# Patient Record
Sex: FEMALE | Race: WHITE | NOT HISPANIC OR LATINO | Employment: FULL TIME | URBAN - METROPOLITAN AREA
[De-identification: names, ages, dates, MRNs, and addresses within clinical notes are randomized per-mention and may not be internally consistent; named-entity substitution may affect disease eponyms.]

---

## 2017-07-24 ENCOUNTER — TELEPHONE (OUTPATIENT)
Dept: OBSTETRICS AND GYNECOLOGY | Facility: CLINIC | Age: 47
End: 2017-07-24

## 2017-07-24 DIAGNOSIS — Z12.31 ENCOUNTER FOR SCREENING MAMMOGRAM FOR BREAST CANCER: Primary | ICD-10-CM

## 2017-07-24 NOTE — TELEPHONE ENCOUNTER
----- Message from Michael Lemos sent at 7/24/2017 12:38 PM CDT -----  Contact: Pt  x_ 1st Request  _ 2nd Request  _ 3rd Request    Who: Pt    Why: requesting mmg order    What Number to Call Back: [phone]    When to Expect a call back: (Before the end of the day)  -- if call after 3:00 call back will be tomorrow.

## 2017-08-07 ENCOUNTER — HOSPITAL ENCOUNTER (OUTPATIENT)
Dept: RADIOLOGY | Facility: HOSPITAL | Age: 47
Discharge: HOME | End: 2017-08-07
Attending: OBSTETRICS & GYNECOLOGY
Payer: COMMERCIAL

## 2017-08-07 VITALS — WEIGHT: 148.13 LBS | BODY MASS INDEX: 27.26 KG/M2 | HEIGHT: 62 IN

## 2017-08-07 DIAGNOSIS — Z12.31 ENCOUNTER FOR SCREENING MAMMOGRAM FOR BREAST CANCER: ICD-10-CM

## 2017-08-07 DIAGNOSIS — Z12.31 VISIT FOR SCREENING MAMMOGRAM: ICD-10-CM

## 2017-08-07 PROCEDURE — 77063 BREAST TOMOSYNTHESIS BI: CPT | Mod: 26,,, | Performed by: RADIOLOGY

## 2017-08-07 PROCEDURE — 77067 SCR MAMMO BI INCL CAD: CPT | Mod: 26,,, | Performed by: RADIOLOGY

## 2017-08-07 PROCEDURE — 77067 SCR MAMMO BI INCL CAD: CPT | Mod: TC

## 2017-08-10 ENCOUNTER — OFFICE VISIT (OUTPATIENT)
Dept: OBSTETRICS AND GYNECOLOGY | Facility: CLINIC | Age: 47
End: 2017-08-10
Attending: OBSTETRICS & GYNECOLOGY
Payer: COMMERCIAL

## 2017-08-10 VITALS
HEIGHT: 62 IN | SYSTOLIC BLOOD PRESSURE: 110 MMHG | WEIGHT: 160.94 LBS | BODY MASS INDEX: 29.62 KG/M2 | DIASTOLIC BLOOD PRESSURE: 60 MMHG

## 2017-08-10 DIAGNOSIS — Z12.4 PAP SMEAR FOR CERVICAL CANCER SCREENING: ICD-10-CM

## 2017-08-10 DIAGNOSIS — N95.1 PERIMENOPAUSAL: ICD-10-CM

## 2017-08-10 DIAGNOSIS — Z01.419 WELL WOMAN EXAM WITH ROUTINE GYNECOLOGICAL EXAM: Primary | ICD-10-CM

## 2017-08-10 PROCEDURE — 99999 PR PBB SHADOW E&M-EST. PATIENT-LVL III: CPT | Mod: PBBFAC,,, | Performed by: OBSTETRICS & GYNECOLOGY

## 2017-08-10 PROCEDURE — 88142 CYTOPATH C/V THIN LAYER: CPT

## 2017-08-10 PROCEDURE — 99396 PREV VISIT EST AGE 40-64: CPT | Mod: S$GLB,,, | Performed by: OBSTETRICS & GYNECOLOGY

## 2017-08-10 RX ORDER — AZITHROMYCIN 250 MG/1
TABLET, FILM COATED ORAL
COMMUNITY
Start: 2017-07-28 | End: 2017-08-10

## 2017-08-10 NOTE — PROGRESS NOTES
Jayne Mejia is a 47 y.o. female  who presents for annual exam.  Patient's last menstrual period was 2016.  She is perimenopausal with LMP 8 months ago.  Denies hot flashes / sweats.  No vaginal dryness.  Reports some tenderness in her breasts- recent mammogram was negative.  Denies changes in her medical / surgical history over the past year.  No GYN complaints.    Mammogram 17: Negative    Past Medical History:   Diagnosis Date    Bartholin cyst     History of abdominoplasty        Past Surgical History:   Procedure Laterality Date    Essure tubal occlusion      NOSE SURGERY      TUBAL LIGATION         OB History      Para Term  AB Living    1 1 1          SAB TAB Ectopic Multiple Live Births                       ROS:  GENERAL: Feeling well overall.   SKIN: Denies rash or lesions.   HEAD: Denies head injury or headache.   NODES: Denies enlarged lymph nodes.   CHEST: Denies chest pain or shortness of breath.   CARDIOVASCULAR: Denies palpitations or left sided chest pain.   ABDOMEN: No abdominal pain, nausea, vomiting or rectal bleeding.   URINARY: No dysuria or hematuria.  REPRODUCTIVE: See HPI.   BREASTS: Reports breast tenderness.  Denies lumps, or nipple discharge.   HEMATOLOGIC: No easy bruisability or excessive bleeding.   MUSCULOSKELETAL: Denies joint pain or swelling.   NEUROLOGIC: Denies syncope or weakness.   PSYCHIATRIC: Denies depression.    PE:   (chaperone present during entire exam)  APPEARANCE: Well nourished, well developed, in no acute distress.  BREASTS: Symmetrical, no skin changes or visible lesions. No palpable masses, nipple discharge or adenopathy bilaterally.  ABDOMEN: Soft. No tenderness or masses. No hernias. No CVA tenderness.  VULVA: No lesions. Normal female genitalia.  URETHRAL MEATUS: Normal size and location, no lesions, no prolapse.  URETHRA: No masses, tenderness, prolapse or scarring.  VAGINA: No lesions, no discharge, no significant cystocele  or rectocele.  CERVIX: No lesions and discharge. PAP done.  UTERUS: Normal size, regular shape, mobile, non-tender, bladder base nontender.  ADNEXA: No masses, tenderness or CDS nodularity.  ANUS PERINEUM: Normal.      Diagnosis:  1. Well woman exam with routine gynecological exam    2. Perimenopausal    3. Pap smear for cervical cancer screening          PLAN:    Orders Placed This Encounter    Liquid-based pap smear, screening       Patient was counseled today on perimenopausal issues.      Follow-up in 1 year.

## 2018-03-13 ENCOUNTER — TELEPHONE (OUTPATIENT)
Dept: OBSTETRICS AND GYNECOLOGY | Facility: CLINIC | Age: 48
End: 2018-03-13

## 2018-03-13 NOTE — TELEPHONE ENCOUNTER
----- Message from Ori Ross sent at 3/13/2018 10:30 AM CDT -----  PLEASE CALL PT SHE IS HAVING SOME ABN BLEEDING [id number]

## 2018-03-13 NOTE — TELEPHONE ENCOUNTER
Tried ext provided and voicemail, unsure if secure. LMOVM to have the patient call the office. On the cell phone number in the chart

## 2018-05-21 DIAGNOSIS — R07.9 CHEST PAIN, UNSPECIFIED TYPE: Primary | ICD-10-CM

## 2018-05-22 ENCOUNTER — OFFICE VISIT (OUTPATIENT)
Dept: CARDIOLOGY | Facility: CLINIC | Age: 48
End: 2018-05-22
Payer: COMMERCIAL

## 2018-05-22 ENCOUNTER — HOSPITAL ENCOUNTER (OUTPATIENT)
Dept: CARDIOLOGY | Facility: CLINIC | Age: 48
Discharge: HOME | End: 2018-05-22
Payer: COMMERCIAL

## 2018-05-22 ENCOUNTER — HOSPITAL ENCOUNTER (OUTPATIENT)
Dept: RADIOLOGY | Facility: HOSPITAL | Age: 48
Discharge: HOME | End: 2018-05-22
Attending: INTERNAL MEDICINE
Payer: COMMERCIAL

## 2018-05-22 VITALS
HEART RATE: 70 BPM | DIASTOLIC BLOOD PRESSURE: 80 MMHG | HEIGHT: 62 IN | SYSTOLIC BLOOD PRESSURE: 120 MMHG | WEIGHT: 160 LBS | BODY MASS INDEX: 29.44 KG/M2

## 2018-05-22 DIAGNOSIS — R51.9 CHRONIC NONINTRACTABLE HEADACHE, UNSPECIFIED HEADACHE TYPE: ICD-10-CM

## 2018-05-22 DIAGNOSIS — G89.29 CHRONIC NONINTRACTABLE HEADACHE, UNSPECIFIED HEADACHE TYPE: ICD-10-CM

## 2018-05-22 DIAGNOSIS — R07.9 CHEST PAIN, UNSPECIFIED TYPE: ICD-10-CM

## 2018-05-22 DIAGNOSIS — R07.89 OTHER CHEST PAIN: ICD-10-CM

## 2018-05-22 DIAGNOSIS — R55 SYNCOPE AND COLLAPSE: ICD-10-CM

## 2018-05-22 DIAGNOSIS — R07.89 OTHER CHEST PAIN: Primary | ICD-10-CM

## 2018-05-22 PROCEDURE — 93000 ELECTROCARDIOGRAM COMPLETE: CPT | Mod: S$GLB,,, | Performed by: INTERNAL MEDICINE

## 2018-05-22 PROCEDURE — 70450 CT HEAD/BRAIN W/O DYE: CPT | Mod: TC

## 2018-05-22 PROCEDURE — 70450 CT HEAD/BRAIN W/O DYE: CPT | Mod: 26,,, | Performed by: RADIOLOGY

## 2018-05-22 PROCEDURE — 99204 OFFICE O/P NEW MOD 45 MIN: CPT | Mod: S$GLB,,, | Performed by: INTERNAL MEDICINE

## 2018-05-22 PROCEDURE — 3008F BODY MASS INDEX DOCD: CPT | Mod: CPTII,S$GLB,, | Performed by: INTERNAL MEDICINE

## 2018-05-22 PROCEDURE — 99999 PR PBB SHADOW E&M-EST. PATIENT-LVL III: CPT | Mod: PBBFAC,,, | Performed by: INTERNAL MEDICINE

## 2018-05-22 NOTE — PROGRESS NOTES
Subjective:   Patient ID:  Jayne Mejia is a 48 y.o. female who presents for evaluation of Dizziness      HPI: She had a syncopal about one month ago after taking bactrim and benadryl. She awoke covered in a rash and got up to go to the bathroom. Without prodrome she syncopized and hit the front of her neck/ jaw and left shoulder on the ground. Her teeth went through her lip. She did not seek medical treatment at the time. She reports that for the past two weeks, she has not felt right. She has a constant fullness in her head and feels like her tongue is swollen. She has no other focal neurologic deficits. She has no pain in her head or neck. She also reports that for the past one week, she has developed intermittent pressure on the left side of her chest which radiates to her left shoulder, left side of her neck and left jaw. It is brought on by emotional stress and resolves on it own. It is not exertional. She has not exercised regularly since December. She has no cardiac history.    ECG: NSR 77 bpm.    Past Medical History:   Diagnosis Date    Bartholin cyst     History of abdominoplasty        Past Surgical History:   Procedure Laterality Date    Essure tubal occlusion      NOSE SURGERY      TUBAL LIGATION         Social History     Social History    Marital status:      Spouse name: N/A    Number of children: N/A    Years of education: N/A     Social History Main Topics    Smoking status: Never Smoker    Smokeless tobacco: Never Used    Alcohol use No    Drug use: No    Sexual activity: Yes     Partners: Male     Other Topics Concern    None     Social History Narrative    None       Family History   Problem Relation Age of Onset    Breast cancer Paternal Grandmother     Cancer Paternal Grandmother     Colon cancer Paternal Grandmother     Heart attack Maternal Grandfather     Hypertension Maternal Grandmother     Diabetes Maternal Grandmother     Hashimoto's thyroiditis Sister      "Ovarian cancer Neg Hx        Patient's Medications   New Prescriptions    No medications on file   Previous Medications    FISH OIL-OMEGA-3 FATTY ACIDS 300-1,000 MG CAPSULE    Take 2 g by mouth once daily.    MULTIVITAMIN (ONE DAILY MULTIVITAMIN) PER TABLET    Take 1 tablet by mouth once daily.    VITAMIN D 1000 UNITS TAB    Take 185 mg by mouth once daily.   Modified Medications    No medications on file   Discontinued Medications    No medications on file       Review of Systems   Constitution: Negative for weakness, malaise/fatigue and weight gain.   HENT: Negative for hearing loss.    Eyes: Negative for visual disturbance.   Cardiovascular: Positive for chest pain and syncope. Negative for claudication, dyspnea on exertion, leg swelling, near-syncope, orthopnea, palpitations and paroxysmal nocturnal dyspnea.   Respiratory: Negative for cough, shortness of breath, sleep disturbances due to breathing, snoring and wheezing.    Endocrine: Negative for cold intolerance, heat intolerance, polydipsia, polyphagia and polyuria.   Hematologic/Lymphatic: Negative for bleeding problem. Does not bruise/bleed easily.   Skin: Negative for rash and suspicious lesions.   Musculoskeletal: Negative for arthritis, falls, joint pain, muscle weakness and myalgias.   Gastrointestinal: Negative for abdominal pain, change in bowel habit, constipation, diarrhea, heartburn, hematochezia, melena and nausea.   Genitourinary: Negative for hematuria and nocturia.   Neurological: Positive for headaches and paresthesias. Negative for excessive daytime sleepiness, dizziness, light-headedness and loss of balance.   Psychiatric/Behavioral: Negative for depression. The patient is not nervous/anxious.    Allergic/Immunologic: Negative for environmental allergies.       /80   Pulse 70   Ht 5' 2" (1.575 m)   Wt 72.6 kg (160 lb)   BMI 29.26 kg/m²     Objective:   Physical Exam   Constitutional: She is oriented to person, place, and time. She " appears well-developed and well-nourished.        HENT:   Head: Normocephalic and atraumatic.   Mouth/Throat: Oropharynx is clear and moist.   Eyes: Conjunctivae and EOM are normal. Pupils are equal, round, and reactive to light. No scleral icterus.   Neck: Normal range of motion. Neck supple. No hepatojugular reflux and no JVD present. No tracheal deviation present. No thyromegaly present.   Cardiovascular: Normal rate, regular rhythm, normal heart sounds and intact distal pulses.  PMI is not displaced.    Pulses:       Carotid pulses are 2+ on the right side, and 2+ on the left side.       Radial pulses are 2+ on the right side, and 2+ on the left side.        Dorsalis pedis pulses are 2+ on the right side, and 2+ on the left side.        Posterior tibial pulses are 2+ on the right side, and 2+ on the left side.   Pulmonary/Chest: Effort normal and breath sounds normal.   Abdominal: Soft. Bowel sounds are normal. She exhibits no distension and no mass. There is no hepatosplenomegaly. There is no tenderness.   Musculoskeletal: She exhibits no edema or tenderness.   Lymphadenopathy:     She has no cervical adenopathy.   Neurological: She is alert and oriented to person, place, and time.   Skin: Skin is warm and dry. No rash noted. No cyanosis or erythema. Nails show no clubbing.   Psychiatric: She has a normal mood and affect. Her speech is normal and behavior is normal.       Lab Results   Component Value Date     11/04/2015    K 4.0 11/04/2015     11/04/2015    CO2 26 11/04/2015    BUN 11 11/04/2015    CREATININE 0.7 11/04/2015    GLU 82 11/04/2015    AST 19 11/04/2015    ALT 13 11/04/2015    ALBUMIN 3.9 11/04/2015    PROT 7.1 11/04/2015    BILITOT 0.3 11/04/2015    WBC 7.25 11/04/2015    HGB 13.4 11/04/2015    HCT 39.4 11/04/2015    MCV 88 11/04/2015     11/04/2015    INR 0.9 05/13/2008    TSH 1.389 11/04/2015    CHOL 189 11/04/2015    HDL 53 11/04/2015    LDLCALC 119.6 11/04/2015    TRIG 82  11/04/2015       Assessment:     1. Other chest pain : I suspect her discomfort is likely musculoskeletal due to her recent syncopal event, however, her pain is not reproducible on exam or with movement. I have ordered an exercise stress echo for further evaluation.    2. Syncope and collapse : Likely secondary to polypharmacy at the time. No recurrence.   3. Chronic nonintractable headache, unspecified headache type : Given her recent syncopal event and head trauma, I have ordered a CT head to rule out subdural hematoma. If her work up is negative, we discussed following up with her PCP for evaluation of a possible neck injury.       Plan:     Jayne was seen today for dizziness.    Diagnoses and all orders for this visit:    Other chest pain  -     CT Head Without Contrast; Future  -     Exercise stress echo with color flow; Future  -     EKG 12-LEAD - Muse; Future    Syncope and collapse  -     CT Head Without Contrast; Future  -     Exercise stress echo with color flow; Future  -     EKG 12-LEAD - Muse; Future    Chronic nonintractable headache, unspecified headache type  -     CT Head Without Contrast; Future  -     Exercise stress echo with color flow; Future  -     EKG 12-LEAD - Muse; Future        Thank you for allowing me to participate in this patient's care. Please do not hesitate to contact me with any questions or concerns.

## 2018-05-22 NOTE — LETTER
May 22, 2018      Amanda Willoughby MD  [address]  [facility] [address]           [facility] - Cardiology  [address]  [facility] [address]  Phone: [phone]          Patient: Jayne Mejia   MR Number: [id number]   YOB: 1970   Date of Visit: 5/22/2018       Dear Dr. Amanda Willoughby:    Thank you for referring Jayne Mejia to me for evaluation. Attached you will find relevant portions of my assessment and plan of care.    If you have questions, please do not hesitate to call me. I look forward to following Jayne Mejia along with you.    Sincerely,    Alayna Aranda MD    Enclosure  CC:  No Recipients    If you would like to receive this communication electronically, please contact [email or web address] or [phone] to request more information on [employer] access.    For providers and/or their staff who would like to refer a patient to [facility], please contact us through our one-stop-shop provider referral line, [facility] , at [phone].    If you feel you have received this communication in error or would no longer like to receive these types of communications, please e-mail [email or web address]

## 2018-05-23 ENCOUNTER — TELEPHONE (OUTPATIENT)
Dept: CARDIOLOGY | Facility: CLINIC | Age: 48
End: 2018-05-23

## 2018-05-26 ENCOUNTER — LAB VISIT (OUTPATIENT)
Dept: LAB | Facility: HOSPITAL | Age: 48
End: 2018-05-26
Attending: INTERNAL MEDICINE
Payer: COMMERCIAL

## 2018-05-26 DIAGNOSIS — E28.39 RESISTANT OVARY SYNDROME: Primary | ICD-10-CM

## 2018-05-26 DIAGNOSIS — R51.9 FACIAL PAIN: ICD-10-CM

## 2018-05-26 DIAGNOSIS — R53.81 DEBILITY: ICD-10-CM

## 2018-05-26 DIAGNOSIS — E53.8 BIOTIN-(PROPIONYL-COA-CARBOXYLASE) LIGASE DEFICIENCY: ICD-10-CM

## 2018-05-26 LAB
25(OH)D3+25(OH)D2 SERPL-MCNC: 62 NG/ML
ALBUMIN SERPL BCP-MCNC: 4.2 G/DL
ALP SERPL-CCNC: 61 U/L
ALT SERPL W/O P-5'-P-CCNC: 21 U/L
ANION GAP SERPL CALC-SCNC: 9 MMOL/L
AST SERPL-CCNC: 21 U/L
BASOPHILS # BLD AUTO: 0.03 K/UL
BASOPHILS NFR BLD: 0.6 %
BILIRUB SERPL-MCNC: 0.6 MG/DL
BILIRUB UR QL STRIP: NEGATIVE
BUN SERPL-MCNC: 14 MG/DL
CALCIUM SERPL-MCNC: 9.3 MG/DL
CHLORIDE SERPL-SCNC: 104 MMOL/L
CHOLEST SERPL-MCNC: 175 MG/DL
CHOLEST/HDLC SERPL: 3.3 {RATIO}
CK SERPL-CCNC: 63 U/L
CLARITY UR: CLEAR
CO2 SERPL-SCNC: 25 MMOL/L
COLOR UR: YELLOW
CREAT SERPL-MCNC: 0.7 MG/DL
DIFFERENTIAL METHOD: NORMAL
EOSINOPHIL # BLD AUTO: 0.3 K/UL
EOSINOPHIL NFR BLD: 5.5 %
ERYTHROCYTE [DISTWIDTH] IN BLOOD BY AUTOMATED COUNT: 12.7 %
EST. GFR  (AFRICAN AMERICAN): >60 ML/MIN/1.73 M^2
EST. GFR  (NON AFRICAN AMERICAN): >60 ML/MIN/1.73 M^2
ESTIMATED AVG GLUCOSE: 94 MG/DL
ESTRADIOL SERPL-MCNC: <10 PG/ML
FERRITIN SERPL-MCNC: 61 NG/ML
FOLATE SERPL-MCNC: 13.8 NG/ML
GLUCOSE SERPL-MCNC: 89 MG/DL
GLUCOSE UR QL STRIP: NEGATIVE
HBA1C MFR BLD HPLC: 4.9 %
HCT VFR BLD AUTO: 39 %
HDLC SERPL-MCNC: 53 MG/DL
HDLC SERPL: 30.3 %
HGB BLD-MCNC: 12.8 G/DL
HGB UR QL STRIP: NEGATIVE
IMM GRANULOCYTES # BLD AUTO: 0.01 K/UL
IMM GRANULOCYTES NFR BLD AUTO: 0.2 %
IRON SERPL-MCNC: 75 UG/DL
KETONES UR QL STRIP: NEGATIVE
LDLC SERPL CALC-MCNC: 108.8 MG/DL
LEUKOCYTE ESTERASE UR QL STRIP: NEGATIVE
LYMPHOCYTES # BLD AUTO: 1.8 K/UL
LYMPHOCYTES NFR BLD: 35.6 %
MCH RBC QN AUTO: 29.6 PG
MCHC RBC AUTO-ENTMCNC: 32.8 G/DL
MCV RBC AUTO: 90 FL
MONOCYTES # BLD AUTO: 0.4 K/UL
MONOCYTES NFR BLD: 8.5 %
NEUTROPHILS # BLD AUTO: 2.5 K/UL
NEUTROPHILS NFR BLD: 49.6 %
NITRITE UR QL STRIP: NEGATIVE
NONHDLC SERPL-MCNC: 122 MG/DL
NRBC BLD-RTO: 0 /100 WBC
PH UR STRIP: 6 [PH] (ref 5–8)
PLATELET # BLD AUTO: 277 K/UL
PMV BLD AUTO: 9.2 FL
POTASSIUM SERPL-SCNC: 3.9 MMOL/L
PROT SERPL-MCNC: 7.5 G/DL
PROT UR QL STRIP: NEGATIVE
RBC # BLD AUTO: 4.32 M/UL
SATURATED IRON: 20 %
SODIUM SERPL-SCNC: 138 MMOL/L
SP GR UR STRIP: 1.01 (ref 1–1.03)
T3FREE SERPL-MCNC: 3 PG/ML
T4 FREE SERPL-MCNC: 1 NG/DL
TOTAL IRON BINDING CAPACITY: 383 UG/DL
TRANSFERRIN SERPL-MCNC: 259 MG/DL
TRIGL SERPL-MCNC: 66 MG/DL
TSH SERPL DL<=0.005 MIU/L-ACNC: 1.88 UIU/ML
URN SPEC COLLECT METH UR: NORMAL
VIT B12 SERPL-MCNC: 726 PG/ML
WBC # BLD AUTO: 4.94 K/UL

## 2018-05-26 PROCEDURE — 84481 FREE ASSAY (FT-3): CPT

## 2018-05-26 PROCEDURE — 85025 COMPLETE CBC W/AUTO DIFF WBC: CPT

## 2018-05-26 PROCEDURE — 36415 COLL VENOUS BLD VENIPUNCTURE: CPT | Mod: PO

## 2018-05-26 PROCEDURE — 82306 VITAMIN D 25 HYDROXY: CPT

## 2018-05-26 PROCEDURE — 82746 ASSAY OF FOLIC ACID SERUM: CPT

## 2018-05-26 PROCEDURE — 82728 ASSAY OF FERRITIN: CPT

## 2018-05-26 PROCEDURE — 82670 ASSAY OF TOTAL ESTRADIOL: CPT

## 2018-05-26 PROCEDURE — 80061 LIPID PANEL: CPT

## 2018-05-26 PROCEDURE — 84439 ASSAY OF FREE THYROXINE: CPT

## 2018-05-26 PROCEDURE — 83036 HEMOGLOBIN GLYCOSYLATED A1C: CPT

## 2018-05-26 PROCEDURE — 83540 ASSAY OF IRON: CPT

## 2018-05-26 PROCEDURE — 81003 URINALYSIS AUTO W/O SCOPE: CPT | Mod: PO

## 2018-05-26 PROCEDURE — 82550 ASSAY OF CK (CPK): CPT

## 2018-05-26 PROCEDURE — 82607 VITAMIN B-12: CPT

## 2018-05-26 PROCEDURE — 80053 COMPREHEN METABOLIC PANEL: CPT

## 2018-05-26 PROCEDURE — 86376 MICROSOMAL ANTIBODY EACH: CPT

## 2018-05-26 PROCEDURE — 84443 ASSAY THYROID STIM HORMONE: CPT

## 2018-05-28 ENCOUNTER — OFFICE VISIT (OUTPATIENT)
Dept: INTERNAL MEDICINE | Facility: CLINIC | Age: 48
End: 2018-05-28
Payer: COMMERCIAL

## 2018-05-28 VITALS
HEART RATE: 98 BPM | BODY MASS INDEX: 29.9 KG/M2 | OXYGEN SATURATION: 98 % | DIASTOLIC BLOOD PRESSURE: 60 MMHG | WEIGHT: 162.5 LBS | SYSTOLIC BLOOD PRESSURE: 100 MMHG | HEIGHT: 62 IN

## 2018-05-28 DIAGNOSIS — S09.90XS HEAD TRAUMA, SEQUELA: ICD-10-CM
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
## 2018-05-28 LAB — THYROPEROXIDASE IGG SERPL-ACNC: <6 IU/ML

## 2018-05-28 PROCEDURE — 99999 PR PBB SHADOW E&M-EST. PATIENT-LVL III: CPT | Mod: PBBFAC,,, | Performed by: INTERNAL MEDICINE

## 2018-05-28 PROCEDURE — 99214 OFFICE O/P EST MOD 30 MIN: CPT | Mod: S$GLB,,, | Performed by: INTERNAL MEDICINE

## 2018-05-28 PROCEDURE — 3008F BODY MASS INDEX DOCD: CPT | Mod: CPTII,S$GLB,, | Performed by: INTERNAL MEDICINE

## 2018-05-28 NOTE — PROGRESS NOTES
"Subjective:      Patient ID: Jayne Mejia is a 48 y.o. female.    Chief Complaint: Headache and Dizziness    HPI:  HPI   5 weeks ago patient had a fall after a vasovagal event related to an illness and medication. Her  is a physician and ordered labs all of which are normal. Patient notes that she hit her jaw and feels that this also caused her temporarily to lose consciousness. She did not seek treatment at this time but has subsequently seen Cardiology where a CT of the head was ordered.    2 weeks after had new symptoms of light headed and dizzy which have continued   Patient Active Problem List   Diagnosis    Other chest pain    Syncope and collapse    Chronic nonintractable headache     Past Medical History:   Diagnosis Date    Bartholin cyst     History of abdominoplasty      Past Surgical History:   Procedure Laterality Date    Essure tubal occlusion      NOSE SURGERY      TUBAL LIGATION       Family History   Problem Relation Age of Onset    Breast cancer Paternal Grandmother     Cancer Paternal Grandmother     Colon cancer Paternal Grandmother     Heart attack Maternal Grandfather     Hypertension Maternal Grandmother     Diabetes Maternal Grandmother     Hashimoto's thyroiditis Sister     Ovarian cancer Neg Hx      Review of Systems: as above  Objective:     Vitals:    05/28/18 1411   BP: 100/60   Pulse: 98   SpO2: 98%   Weight: 73.7 kg (162 lb 7.7 oz)   Height: 5' 2" (1.575 m)   PainSc: 0-No pain     Body mass index is 29.72 kg/m².  Physical Exam   Constitutional: She is oriented to person, place, and time. She appears well-developed and well-nourished. No distress.   Neck: Carotid bruit is not present. No thyromegaly present.   Cardiovascular: Normal rate, regular rhythm and normal heart sounds.  PMI is not displaced.    Pulmonary/Chest: Effort normal and breath sounds normal. No respiratory distress.   Abdominal: Soft. Bowel sounds are normal. She exhibits no distension. There is " no tenderness.   Musculoskeletal: She exhibits no edema.   Neurological: She is alert and oriented to person, place, and time. She has normal strength. No cranial nerve deficit. She displays a negative Romberg sign. Coordination and gait normal.     Assessment:     1. Fall, sequela    2. Head trauma, sequela      Plan:   Jayne was seen today for headache and dizziness.    Diagnoses and all orders for this visit:    Fall, sequela: no direct physical complications found  -     Ambulatory consult to Neurology    Head trauma, sequela: will have the patient see Neurology for a possible concussison syndrome.  -     Ambulatory consult to Neurology      Follow-up if symptoms worsen or fail to improve.     Medication List          Accurate as of 5/28/18  2:46 PM. If you have any questions, ask your nurse or doctor.               CONTINUE taking these medications    fish oil-omega-3 fatty acids 300-1,000 mg capsule     ONE DAILY MULTIVITAMIN per tablet  Generic drug:  multivitamin     vitamin D 1000 units Tab

## 2018-05-29 ENCOUNTER — HOSPITAL ENCOUNTER (OUTPATIENT)
Dept: CARDIOLOGY | Facility: CLINIC | Age: 48
Discharge: HOME | End: 2018-05-29
Attending: INTERNAL MEDICINE
Payer: COMMERCIAL

## 2018-05-29 DIAGNOSIS — R51.9 CHRONIC NONINTRACTABLE HEADACHE, UNSPECIFIED HEADACHE TYPE: ICD-10-CM

## 2018-05-29 DIAGNOSIS — R55 SYNCOPE AND COLLAPSE: ICD-10-CM

## 2018-05-29 DIAGNOSIS — R07.89 OTHER CHEST PAIN: ICD-10-CM

## 2018-05-29 DIAGNOSIS — G89.29 CHRONIC NONINTRACTABLE HEADACHE, UNSPECIFIED HEADACHE TYPE: ICD-10-CM

## 2018-05-29 LAB
DIASTOLIC DYSFUNCTION: NO
ESTIMATED PA SYSTOLIC PRESSURE: 22.18
MITRAL VALVE MOBILITY: NORMAL
RETIRED EF AND QEF - SEE NOTES: 55 (ref 55–65)

## 2018-05-29 PROCEDURE — 93320 DOPPLER ECHO COMPLETE: CPT | Mod: S$GLB,,, | Performed by: INTERNAL MEDICINE

## 2018-05-29 PROCEDURE — 93351 STRESS TTE COMPLETE: CPT | Mod: S$GLB,,, | Performed by: INTERNAL MEDICINE

## 2018-05-29 PROCEDURE — 93325 DOPPLER ECHO COLOR FLOW MAPG: CPT | Mod: S$GLB,,, | Performed by: INTERNAL MEDICINE

## 2018-05-30 ENCOUNTER — TELEPHONE (OUTPATIENT)
Dept: CARDIOLOGY | Facility: CLINIC | Age: 48
End: 2018-05-30

## 2018-05-30 DIAGNOSIS — Z12.11 COLON CANCER SCREENING: ICD-10-CM

## 2018-06-20 ENCOUNTER — OFFICE VISIT (OUTPATIENT)
Dept: NEUROLOGY | Facility: CLINIC | Age: 48
End: 2018-06-20
Payer: COMMERCIAL

## 2018-06-20 VITALS
HEIGHT: 62 IN | BODY MASS INDEX: 29.44 KG/M2 | HEART RATE: 82 BPM | DIASTOLIC BLOOD PRESSURE: 78 MMHG | WEIGHT: 160 LBS | SYSTOLIC BLOOD PRESSURE: 115 MMHG

## 2018-06-20 DIAGNOSIS — S09.90XS DIZZINESS DUE TO OLD HEAD INJURY: ICD-10-CM

## 2018-06-20 DIAGNOSIS — R42 DIZZINESS DUE TO OLD HEAD INJURY: ICD-10-CM

## 2018-06-20 DIAGNOSIS — F07.81 POSTCONCUSSIVE SYNDROME: Primary | ICD-10-CM

## 2018-06-20 PROCEDURE — 99999 PR PBB SHADOW E&M-EST. PATIENT-LVL III: CPT | Mod: PBBFAC,,, | Performed by: PSYCHIATRY & NEUROLOGY

## 2018-06-20 PROCEDURE — 99204 OFFICE O/P NEW MOD 45 MIN: CPT | Mod: S$GLB,,, | Performed by: PSYCHIATRY & NEUROLOGY

## 2018-06-20 PROCEDURE — 3008F BODY MASS INDEX DOCD: CPT | Mod: CPTII,S$GLB,, | Performed by: PSYCHIATRY & NEUROLOGY

## 2018-06-20 RX ORDER — AMITRIPTYLINE HYDROCHLORIDE 25 MG/1
25 TABLET, FILM COATED ORAL NIGHTLY
Qty: 30 TABLET | Refills: 11 | Status: SHIPPED | OUTPATIENT
Start: 2018-06-20 | End: 2018-12-11 | Stop reason: SDUPTHER

## 2018-06-20 NOTE — ASSESSMENT & PLAN NOTE
"Persistent but mild dull headache, complaint of "fogginess" and some dizziness since falling and striking head about 2 months ago. Nothing focal on examination and central imaging was negative for evidence of bleed. Symptoms are consistent with post-concussive syndrome with some vestibular dysfunction. We discussed that symptoms such as these following concussion are sometimes slow to resolve and may require several months to resolve. She was encouraged to continue to add elements from previous active lifestyle back into her daily regimen as tolerated (exercise, social activity, work, etc).    - Referral to PT with vestibular assessment and training   - Trial of Elavil 25 mg QHS  "

## 2018-06-20 NOTE — LETTER
June 20, 2018      Amanda Willoughby MD  [address]  [facility] [address]           [facility]  [address]  [facility] [address]  Phone: [phone]  Fax: [phone]          Patient: Jayne Mejia   MR Number: [id number]   YOB: 1970   Date of Visit: 6/20/2018       Dear Dr. Amanda Willoughby:    Thank you for referring Jayne Mejia to me for evaluation. Attached you will find relevant portions of my assessment and plan of care.    If you have questions, please do not hesitate to call me. I look forward to following Jayne Mejia along with you.    Sincerely,    Joshua Mckeon MD    Enclosure  CC:  No Recipients    If you would like to receive this communication electronically, please contact [email or web address] or [phone] to request more information on [employer] Link access.    For providers and/or their staff who would like to refer a patient to [facility], please contact us through our one-stop-shop provider referral line, [facility], at [phone].    If you feel you have received this communication in error or would no longer like to receive these types of communications, please e-mail [email or web address]

## 2018-06-20 NOTE — PROGRESS NOTES
"Subjective:     Chief Complaint:  Consult      History of Present Illness:  Jayne Mejia is a 48 y.o. female who presents today for initial consultation for symptoms of mild dizziness, "fogginess" and persistent dull headache that have been present since she fell and struck her head at home in April 2018. She had taken Bactrim and Benadryl, became dizzy and fell when she stood up. She struck her jaw on furniture and believes that she lost consciousness. There were no features worrisome for seizure. She had a significant lesion to the lower lip. CT Head was done at the ER and showed no evidence of intracranial bleed. She has had no focal deficits since the accident and has recently resumed daily exercising, etc. There have been no changes in coordination, speech, vision, sensation, mentation, orientation, etc. No tinnitus. She does complain of persistent mild headache that is responsive the NSAIDs, which she uses only occasionally. Symptoms have not been progressive, but are also not getting better.    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 She is driving and denies any difficulties with perception, directions, etc.    Review of Systems  Review of Systems   Constitutional: Negative for activity change, fatigue and fever.   HENT: Negative for hearing loss, trouble swallowing and voice change.    Eyes: Negative for pain, redness and visual disturbance.   Respiratory: Negative for choking, chest tightness and shortness of breath.    Cardiovascular: Negative for chest pain.   Gastrointestinal: Negative for abdominal pain, nausea and vomiting.   Endocrine: Negative for cold intolerance.   Genitourinary: Negative for frequency.   Musculoskeletal: Negative for arthralgias, back pain, gait problem, joint swelling, myalgias and neck pain.   Skin: Negative for color change.   Allergic/Immunologic: Negative for immunocompromised state.   Neurological: " "Positive for dizziness and headaches. Negative for seizures, speech difficulty, weakness and numbness.   Hematological: Negative for adenopathy.   Psychiatric/Behavioral: Negative for agitation, behavioral problems, dysphoric mood and suicidal ideas.        Objective:     Vitals:    06/20/18 1307   BP: 115/78   Pulse: 82   Weight: 72.6 kg (160 lb)   Height: 5' 2" (1.575 m)   PainSc: 0-No pain       Neurologic Exam     Mental Status   Oriented to person, place, and time.   Attention: normal.   Speech: speech is normal   Level of consciousness: alert  Knowledge: good.     Cranial Nerves     CN II   Visual fields full to confrontation.     CN III, IV, VI   Pupils are equal, round, and reactive to light.  Extraocular motions are normal.     CN V   Facial sensation intact.     CN VII   Facial expression full, symmetric.     CN VIII   Hearing: intact    CN IX, X   CN IX normal.     CN XI   CN XI normal.     CN XII   CN XII normal.     Motor Exam   Muscle bulk: normal  Overall muscle tone: normal    Strength   Strength 5/5 throughout.     Sensory Exam   Light touch normal.   Vibration normal.     Gait, Coordination, and Reflexes     Gait  Gait: normal    Tremor   Resting tremor: absent  Intention tremor: absent  Action tremor: absent    Reflexes   Right brachioradialis: 2+  Left brachioradialis: 2+  Right biceps: 2+  Left biceps: 2+  Right triceps: 2+  Left triceps: 2+  Right patellar: 2+  Left patellar: 2+  Right achilles: 2+  Left achilles: 2+      Physical Exam   Constitutional: She is oriented to person, place, and time. She appears well-developed and well-nourished.   HENT:   Head: Normocephalic and atraumatic.   Eyes: EOM are normal. Pupils are equal, round, and reactive to light.   Neck: Normal range of motion. Neck supple. No thyromegaly present.   Cardiovascular: Normal rate.    Pulmonary/Chest: Effort normal.   Abdominal: Soft.   Lymphadenopathy:     She has no cervical adenopathy.   Neurological: She is oriented " "to person, place, and time. She has normal strength. Gait normal.   Reflex Scores:       Tricep reflexes are 2+ on the right side and 2+ on the left side.       Bicep reflexes are 2+ on the right side and 2+ on the left side.       Brachioradialis reflexes are 2+ on the right side and 2+ on the left side.       Patellar reflexes are 2+ on the right side and 2+ on the left side.       Achilles reflexes are 2+ on the right side and 2+ on the left side.  Skin: Skin is warm and dry.   Psychiatric: She has a normal mood and affect. Her speech is normal and behavior is normal. Thought content normal.   Vitals reviewed.        Lab Results   Component Value Date    WBC 4.94 05/26/2018    HGB 12.8 05/26/2018    HCT 39.0 05/26/2018     05/26/2018    CHOL 175 05/26/2018    TRIG 66 05/26/2018    HDL 53 05/26/2018    ALT 21 05/26/2018    AST 21 05/26/2018     05/26/2018    K 3.9 05/26/2018     05/26/2018    CREATININE 0.7 05/26/2018    BUN 14 05/26/2018    CO2 25 05/26/2018    TSH 1.884 05/26/2018    HGBA1C 4.9 05/26/2018    [id number] 05/26/2018         Assessment and Plan:     Problem List Items Addressed This Visit     Postconcussive syndrome - Primary    Current Assessment & Plan     Persistent but mild dull headache, complaint of "fogginess" and some dizziness since falling and striking head about 2 months ago. Nothing focal on examination and central imaging was negative for evidence of bleed. Symptoms are consistent with post-concussive syndrome with some vestibular dysfunction. We discussed that symptoms such as these following concussion are sometimes slow to resolve and may require several months to resolve. She was encouraged to continue to add elements from previous active lifestyle back into her daily regimen as tolerated (exercise, social activity, work, etc).    - Referral to PT with vestibular assessment and training   - Trial of Elavil 25 mg QHS         Relevant Orders    Ambulatory " consult to Physical Therapy    Dizziness due to old head injury    Relevant Orders    Ambulatory consult to Physical Therapy            Joshua Mckeon MD  [facility]

## 2018-07-06 ENCOUNTER — CLINICAL SUPPORT (OUTPATIENT)
Dept: REHABILITATION | Facility: HOSPITAL | Age: 48
End: 2018-07-06
Attending: PSYCHIATRY & NEUROLOGY
Payer: COMMERCIAL

## 2018-07-06 DIAGNOSIS — R42 DIZZINESS: ICD-10-CM

## 2018-07-06 PROCEDURE — 97110 THERAPEUTIC EXERCISES: CPT | Mod: PN

## 2018-07-06 PROCEDURE — 97140 MANUAL THERAPY 1/> REGIONS: CPT | Mod: PN

## 2018-07-06 PROCEDURE — 97161 PT EVAL LOW COMPLEX 20 MIN: CPT | Mod: PN

## 2018-07-06 NOTE — PATIENT INSTRUCTIONS
"Gaze Stabilization: Tip Card    1.Target must remain in focus, not blurry, and appear stationary while head is in motion.  2.Perform exercises with small head movements (45° to either side of midline).  3.Increase speed of head motion so long as target is in focus.  4.If you wear eyeglasses, be sure you can see target through lens (therapist will give specific instructions for bifocal / progressive lenses).  5.These exercises may provoke dizziness or nausea. Work through these symptoms. If too dizzy, slow head movement slightly. Rest between each exercise.  6.Exercises demand concentration; avoid distractions.  7.For safety, perform standing exercises close to a counter, wall, corner, or next to someone.    Copyright © I. All rights reserved.       NECK TENSION: Assisted Stretch        Reach right arm around head and hold slightly above ear. Gently bring right ear toward right shoulder. Hold position for _30__ seconds. Repeat with other arm.  Repeat _3__ times, alternating arms. Do __3_ times per day.    Copyright © I. All rights reserved.   Feet Together (Compliant Surface) Varied Arm Positions - Eyes Closed        Stand on compliant surface: ________ with feet together and arms out. Close eyes and visualize upright position. Hold__30__ seconds.  Repeat _3___ times per session. Do __3__ sessions per day.    Copyright © [employer]. All rights reserved.   Feet Heel-Toe "Tandem", Head Motion - Eyes Closed        With eyes closed and right foot directly in front of the other, move head slowly, up and down.  Repeat __30__ seconds per session. Do __3__ sessions per day.    Copyright © I. All rights reserved.   Gaze Stabilization: Sitting        Keeping eyes on target in hand arms length away, tilt head down 15-30° and move head side to side for __20__ repetitions. Repeat while moving head up and down for ____ seconds.  Do __3__ sessions per day.  Repeat using target on pattern background.    Copyright © [employer]. All rights " reserved.   Movements: Head / Eyes (Pictorial Reference)        Tilt head down 15-30°. Eyes fixed on target, head moves opposite direction of moving target: ________________.    Therapist: Use this card with Eye Exercises 14 and 15.    Copyright © [employer]. All rights reserved.

## 2018-07-06 NOTE — PLAN OF CARE
"[facility] Physical Therapy Initial Evaluation    Name: Jayne Mejia  Clinic Number: [id number]    Jayne is a 48 y.o. female evaluated on 7/6/2018.     Diagnosis:   Encounter Diagnosis   Name Primary?    Dizziness      Physician: Joshua Mckeon*  Treatment Orders: PT Eval and Treat -       Vestibular assessment and training. Post-concussive dizziness.         Past Medical History:   Diagnosis Date    Bartholin cyst     History of abdominoplasty      Review of patient's allergies indicates:   Allergen Reactions    Bactrim [sulfamethoxazole-trimethoprim] Other (See Comments)     Top lip swelling     Precautions: Falls, vestibular  Occupation: Nurse - [employer]    Envoirnmental concerns: none, pt lives with , three cats, two dogs, squirrel, daughter in pharmacy school  Cultural, Spiritual, Developmental and Educational concerns:none  Abuse/Neglect, Nutritional concerns: none    Subjective  Pt reports a fall in which she hit the front of her head and was unconscious on April 18th and reports "swimmy head" and pressure to back of neck. CT scan negative. When she came to she called her . She does not take any medication for this currently. She reports taking medication she was allergic to (Bactrim) and took  Benadryl also to counteract the side effects. She reports waking to go to the bathroom in the middle of the night and this is when she fell. She reports she just deals with symptoms of dizziness and "swimmy head." She reports she is able to drive and exercise. With exercise and putting more pressure to her neck or head puts a full feeling in her head and ears. Increased awareness with driving. Pt reports increased neck pain and check pulling    Increases symptoms: exercise with increased pressure to her head, increased sensitivity to change in location, climbing ladder makes her feel dizzy  Decreases Symptoms: rest    Diagnostic Tests: " CT    Pain Scale: Jayne rates pain to be 2-3 on a scale of 1-10.    Patient Goals: to feel like myself again, no hesitation about exercise or getting up too fast.     Objective  Observation: Pt is 48 year old WD, WN, female who is alert and oriented x 3.     Posture: unremarkable    Type of dizziness:   Motion induced - quickly turning around  Duration - 1 minute  Frequency - 1-2 times per day  Provocation factors - no  Auditory complaints - no  Falls: no    Red flags:  Decline in hearing - no  Dysarthria - no  dis coordination - no  Diplopia - no  Decreased mentation and urinary incontinence - no  Decline in strength/weight - no  decreased consciousness - yes, when fell as a result of medication  dysfunction of cranial nerves Head pain: no    Examination:  Spontaneous nystagmus: no  Pursuit: negative  Gave evoked Nystagmus: no  Saccades: no, some dizziness with VOR testing recovery of 34 seconds with horizontal; VOR x 2 1 minute reocvery  Head impulse: negative    Head shaking: negative    Fukuda step test negative    Balance:  Past pointing  Static: romber and SLS    CTSIB:  Firm EO 30 seconds no sway  Firm EC 29 seconds opened eyes, mild sway posterior and R lateral  Compliant EO 30 seconds with mild sway  Compliant EC 14 seconds opened eyes    Tandem EO 30 seconds no sway  Tandem EC 27 seconds then LOB mild sway    Cervical  ROM Increased pain?   Flexion 50 No, dizzy   Extension 50 No, dizzy   Side Bending Right 30 Pulling on L    Side Bending Left 30 no   Rotation Right 56 no   Rotation Left 61 no     Cervical Strength Increased Pain?   Flexion 4+/5    Extension 4+/5    Side Bending Left 4+/5    Side Bending Right 4+/5      Cervical Special Tests +/-   Cervical Compression -   Cervical Distraction -     Joint Mobility: unable to assess due to increased muscle tightness  Palpation: Pt presents with significant tightness to B suboccipitals, cervical paraspinals R>L, and upper traps  Sensation: intact to light  touch    Treatment:  Time In: 11:40  Time Out: 12:00    PT Evaluation Completed? Yes  Discussed Plan of Care with patient: Yes    Jayne received instruction in and demonstrated neuromuscular re education for 10 minutes of vestibular rehab and stretching including:  Upper trap 1 x 30 seconds B  VOR x 1 x 10  VOR x 2 x 10    Pt received manual therapy techniques including myofascial release to B upper traps, and suboccipital release for a total of 10 minutes.     Written Home Exercises Provided: See above; Tandem with eyes closed, NBOS with eyes closed on compliant surface  Jayne demo good understanding of the education provided. Patient demo good return demo of skill of exercises.    History  Co-morbidities and personal factors that may impact the plan of care Examination  Body Structures and Functions, activity limitations and participation restrictions that may impact the plan of care    Clinical Presentation   Co-morbidities:   none        Personal Factors:   no deficits Body Regions:   head  neck    Body Systems:    ROM  strength  balance  vestibular dysfunction            Participation Restrictions:   none     Activity limitations:   Learning and applying knowledge  no deficits    General Tasks and Commands  no deficits    Communication  no deficits    Mobility  no deficits    Self care  no deficits    Domestic Life  no deficits    Interactions/Relationships  no deficits    Life Areas  no deficits    Community and Social Life  recreation and leisure         stable and uncomplicated                      low   low  moderate Decision Making/ Complexity Score:  low     CMS Impairment/Limitation/Restriction for FOTO Brain Injury Survey  Status Limitation G-Code CMS Severity Modifier  Intake 93% 7% Current Status CI - At least 1 percent but less than 20 percent  Predicted 96% 4% Goal Status+ CI - At least 1 percent but less than 20 percent    Assessment  This is a 48 y.o. female referred to outpatient physical therapy  and presents with a medical diagnosis of   Encounter Diagnosis   Name Primary?    Dizziness     and demonstrates limitations as described in the problem list. Pt will benefit from physical therapy services in order to maximize pain free and/or functional use of cervical spine. The following goals were discussed with the patient and patient is in agreement with them as to be addressed in the treatment plan.     Problem List: pain, decreased ROM, decreased flexibility, decreased strength and decreased balance and stability.    Short Term Goals:  4 weeks  1. Pt will demonstrate increased R cervical rotation by 5 degrees for increased ease with cervical rotation.   2. Pt will report 1-2/10 intensity of headache during the day  3. Pt will report decreased duration of dizziness w/ turning to < 30 seconds  4. Pt will present with increased cervical strength to 5/5 for decreased stress to cervical spine.   5. Pt will report decreased fullness in head with performing heavy straining exercises, core strengthening.     Long Term Goals: 8-12 weeks  1. Pt will demonstrate increased R cervical rotation by 10 degrees for increased ease with cervical rotation.   2. Pt will report without headache during the day  3. Pt will report decreased duration of dizziness w/ turning to < 10 seconds   4. Pt will report no fullness in head with performing heavy straining exercises, core strengthening.   5. Pt will be independent with HEP and self management of symptoms.     Plan  Pt will be treated by physical therapy 2 times a week for 12 weeks for designated treatment time frame to address vestibular rehab, manual therapy, stretching, strengthening, dry needling, and any other skilled physical therapy technique deemed necessary in order to achieve established goals. Jayne may at times be seen by a PTA as part of the Rehab Team.     Alpa Trevizo, RANADT      I certify the need for these services furnished under this plan of treatment and while  under my care.         ___________________________________  Physician/Referring Practitioner        _________________  Date of Signature

## 2018-07-13 ENCOUNTER — CLINICAL SUPPORT (OUTPATIENT)
Dept: REHABILITATION | Facility: HOSPITAL | Age: 48
End: 2018-07-13
Attending: PSYCHIATRY & NEUROLOGY
Payer: COMMERCIAL

## 2018-07-13 DIAGNOSIS — R42 DIZZINESS: ICD-10-CM

## 2018-07-13 PROCEDURE — 97140 MANUAL THERAPY 1/> REGIONS: CPT | Mod: PN

## 2018-07-13 PROCEDURE — 97110 THERAPEUTIC EXERCISES: CPT | Mod: PN

## 2018-07-13 NOTE — PATIENT INSTRUCTIONS
Levator Scapula Stretch, Sitting        Sit, one hand tucked under hip on side to be stretched, other hand over top of head. Turn head toward other side and look down. Use hand on head to gently stretch neck in that position. Hold __30_ seconds.  Repeat _3__ times per session. Do _3__ sessions per day.    Copyright © I. All rights reserved.

## 2018-07-18 ENCOUNTER — CLINICAL SUPPORT (OUTPATIENT)
Dept: REHABILITATION | Facility: HOSPITAL | Age: 48
End: 2018-07-18
Attending: PSYCHIATRY & NEUROLOGY
Payer: COMMERCIAL

## 2018-07-18 DIAGNOSIS — R42 DIZZINESS: ICD-10-CM

## 2018-07-18 PROCEDURE — 97140 MANUAL THERAPY 1/> REGIONS: CPT | Mod: PN

## 2018-07-18 PROCEDURE — 97110 THERAPEUTIC EXERCISES: CPT | Mod: PN

## 2018-07-18 PROCEDURE — 97112 NEUROMUSCULAR REEDUCATION: CPT | Mod: PN

## 2018-07-18 NOTE — PROGRESS NOTES
[facility] Physical Therapy Daily Note    Name: Jayne Mejia  Clinic Number: [id number]  Date of Treatment: 7/18/2018   Diagnosis:   Encounter Diagnosis   Name Primary?    Dizziness        Visit number: 3  Start date: 7/6/18  POC End date: 9/28/18    Time in: 9:05 am  Time Out: 10:00 am  Total Treatment Time: 50    Precautions: fall, standard    Subjective:    Jayne reports continued improvement of symptoms noted with decreased intensity of symptoms. HEP causes increased dizziness but can recovery from it and continues to report decreased incident of dizziness. Patient reports their pain to be 2/10 on a 0-10 scale with 0 being no pain and 10 being the worst pain imaginable.    Objective    Jayne received the following manual therapy techniques: Myofacial release was applied to the: cervical paraspinals, upper trap, suboccipital release for 10 minutes.     Pt received dry needling to B upper traps with two needles into the muscle belly with lift technique. Pt with normal response to treatment and demonstrated improved cervical AROM into rotation after needling. Pt not billed for this treatment and total treatment time was 10 minutes.     Therapeutic exercises for strength and posture for 10 minutes including:  Chin tucks x 10  Scap retraction x 20    Patient participated in neuromuscular re-education activities to improve: Balance, Proprioception and vestibular function for 20 minutes. The following activities were included:   Seated 4 feet in room with door open and facing gym:  -VOR x 1 horizontal with target 4 feet away increase in symptoms foggy with 20 second recovery, vertical foggy - 30 second recovery   -VOR x 2 horizontal with target 4 feet away increased dizziness, 38 seconds; vertical 33 seconds     Not performed:  Firm EC 29 seconds opened eyes, mild sway posterior and R lateral  Compliant EO 30 seconds with mild sway  Compliant EC 30 seconds with arms out from side and with  moderate sway.   Tandem EO 30 seconds no sway  Tandem EC 30 mild - moderate sway with arms out to side.    SLS: arms out to side  L 30 seconds EC  R 13 seconds EC     Written Home Exercises Provided: continue with stretching and balance   Pt demo good understanding of the education provided. Jayne demonstrated good return demonstration of activities.     Assessment:   Pt received dry needling to B upper traps with decreased tightness and noted increase in cervical AROM after treatment. Pt able to perform VOR x 2 with increased speed without loss of focus. She demonstrates improved VOR x 1 with increased distance    Pt will continue to benefit from skilled PT intervention. Medical Necessity is demonstrated by:  Fall Risk, Unable to participate fully in daily activities, Requires skilled supervision to complete and progress HEP and decreased vestibular function.    Patient is making good progress towards established goals.    New/Revised goals: none    Short Term Goals:  4 weeks  1. Pt will demonstrate increased R cervical rotation by 5 degrees for increased ease with cervical rotation.   2. Pt will report 1-2/10 intensity of headache during the day  3. Pt will report decreased duration of dizziness w/ turning to < 30 seconds  4. Pt will present with increased cervical strength to 5/5 for decreased stress to cervical spine.   5. Pt will report decreased fullness in head with performing heavy straining exercises, core strengthening.      Long Term Goals: 8-12 weeks  1. Pt will demonstrate increased R cervical rotation by 10 degrees for increased ease with cervical rotation.   2. Pt will report without headache during the day  3. Pt will report decreased duration of dizziness w/ turning to < 10 seconds   4. Pt will report no fullness in head with performing heavy straining exercises, core strengthening.   5. Pt will be independent with HEP and self management of symptoms.     Plan:  Continue with established Plan of  Care towards PT goals.

## 2018-07-27 ENCOUNTER — CLINICAL SUPPORT (OUTPATIENT)
Dept: REHABILITATION | Facility: HOSPITAL | Age: 48
End: 2018-07-27
Attending: PSYCHIATRY & NEUROLOGY
Payer: COMMERCIAL

## 2018-07-27 DIAGNOSIS — R42 DIZZINESS: ICD-10-CM

## 2018-07-27 PROCEDURE — 97140 MANUAL THERAPY 1/> REGIONS: CPT | Mod: PN

## 2018-07-27 PROCEDURE — 97110 THERAPEUTIC EXERCISES: CPT | Mod: PN

## 2018-07-27 PROCEDURE — 97112 NEUROMUSCULAR REEDUCATION: CPT | Mod: PN

## 2018-07-27 NOTE — PROGRESS NOTES
[facility] Physical Therapy Daily Note    Name: Jayne Mejia  Clinic Number: [id number]  Date of Treatment: 7/27/2018   Diagnosis:   Encounter Diagnosis   Name Primary?    Dizziness      Visit number: 4  Start date: 7/6/18  POC End date: 9/28/18    Time in: 9:05 am  Time Out: 10:00 am  Total Treatment Time: 50    Precautions: fall, standard    Subjective:    Jayne reports soreness after last visit. She is feeling much better but feels her dizziness is still there.   Patient reports their pain to be 2/10 on a 0-10 scale with 0 being no pain and 10 being the worst pain imaginable.    Objective    Jayne received the following manual therapy techniques: Myofacial release was applied to the: cervical paraspinals, upper trap, suboccipital release for 10 minutes.     Pt received dry needling to B upper traps with two needles into the muscle belly with lift technique. Pt with normal response to treatment and demonstrated improved cervical AROM into rotation after needling. Pt not billed for this treatment and total treatment time was 10 minutes.     Therapeutic exercises for strength and posture for 10 minutes including:  Chin tucks x 10  Scap retraction x 20  Prone shoulder ext  Supine SA punch    Patient participated in neuromuscular re-education activities to improve: Balance, Proprioception and vestibular function for 10 minutes. The following activities were included:  Tandem EC 30 mild - moderate sway with arms out to side.    SLS: arms out to side  L 30 seconds EC  R 13 seconds EC     NP:     Seated 4 feet in room with door open and facing gym:  -VOR x 1 horizontal with target 4 feet away increase in symptoms foggy with 20 second recovery, vertical foggy - 30 second recovery   -VOR x 2 horizontal with target 4 feet away increased dizziness, 38 seconds; vertical 33 seconds     Not performed:  Firm EC 29 seconds opened eyes, mild sway posterior and R lateral  Compliant EC 30 seconds with arms  out from side and with moderate sway.     Written Home Exercises Provided: continue with balance with eyes closed   Pt demo good understanding of the education provided. Jayne demonstrated good return demonstration of activities.     Assessment:   Pt progressed with postural strengthening to decrease stress to cervical spine. No increase in symptoms with postural strengthening. She continues with limitations in balance mainly with eyes closed.     Pt will continue to benefit from skilled PT intervention. Medical Necessity is demonstrated by:  Fall Risk, Unable to participate fully in daily activities, Requires skilled supervision to complete and progress HEP and decreased vestibular function.    Patient is making good progress towards established goals.    New/Revised goals: none    Short Term Goals:  4 weeks  1. Pt will demonstrate increased R cervical rotation by 5 degrees for increased ease with cervical rotation.   2. Pt will report 1-2/10 intensity of headache during the day  3. Pt will report decreased duration of dizziness w/ turning to < 30 seconds  4. Pt will present with increased cervical strength to 5/5 for decreased stress to cervical spine.   5. Pt will report decreased fullness in head with performing heavy straining exercises, core strengthening.      Long Term Goals: 8-12 weeks  1. Pt will demonstrate increased R cervical rotation by 10 degrees for increased ease with cervical rotation.   2. Pt will report without headache during the day  3. Pt will report decreased duration of dizziness w/ turning to < 10 seconds   4. Pt will report no fullness in head with performing heavy straining exercises, core strengthening.   5. Pt will be independent with HEP and self management of symptoms.     Plan:  Continue with established Plan of Care towards PT goals. Possible DC next visit.

## 2018-08-01 ENCOUNTER — CLINICAL SUPPORT (OUTPATIENT)
Dept: REHABILITATION | Facility: HOSPITAL | Age: 48
End: 2018-08-01
Attending: PSYCHIATRY & NEUROLOGY
Payer: COMMERCIAL

## 2018-08-01 DIAGNOSIS — R42 DIZZINESS: ICD-10-CM

## 2018-08-01 PROCEDURE — 97112 NEUROMUSCULAR REEDUCATION: CPT | Mod: PN

## 2018-08-01 PROCEDURE — 97110 THERAPEUTIC EXERCISES: CPT | Mod: PN

## 2018-08-01 NOTE — PROGRESS NOTES
[facility] Physical Therapy Daily Note    Name: Jayne Mejia  Clinic Number: [id number]  Date of Treatment: 8/1/2018   Diagnosis:   Encounter Diagnosis   Name Primary?    Dizziness      Visit number: 5  Start date: 7/6/18  POC End date: 9/28/18    Time in: 9:10 am  Time Out: 9:55 am  Total Treatment Time: 45 minutes    Precautions: fall, standard    Subjective:    Jayne reports 30% improvement in symptoms since beginning in therapy. She reports most difficulty is with driving at night with an odd feeling. Quick turns at work are a little better. No difficulty sleeping nor tingling to head - she changed pillow when she started therapy. Patient reports their pain to be 2/10 dizziness all the time but functional on a 0-10 scale with 0 being no pain and 10 being the worst pain imaginable. Pt would like today to be her last visit. She is comfortable with continuing with HEP towards remaining goals.     Objective    CTSIB:  Firm EO 30 seconds no sway  Firm EC 30 seconds mild sway  But able to hold the entire time  Compliant EO 30 seconds with mild sway  Compliant EC 30 seconds mild sway     Tandem EO 30 seconds no sway  Tandem 30 seconds mild sway     Cervical  ROM Increased pain?   Flexion 55 No   Extension 58 No   Side Bending Right 35 Pulling on L    Side Bending Left 35 no   Rotation Right 61 no   Rotation Left 66 no      Cervical Strength Increased Pain?   Flexion 5/5  no   Extension 5/5  no   Side Bending Left 5/5  no   Side Bending Right 5/5 no       Cervical Special Tests +/-   Cervical Compression -   Cervical Distraction -      Joint Mobility: limited in down ligeds left greater than R but improved since intial eval.   Palpation: Pt presents with decreased tightness to B suboccipitals, cervical paraspinals R>L, and upper traps  Sensation: intact to light touch      Jayne received the following manual therapy techniques: Myofacial release was applied to the: cervical paraspinals, upper  trap, suboccipital release for 10 minutes.     Therapeutic exercises for strength and posture for 10 minutes including:  Chin tucks x 10  SL shoulder ER 2# x 20  Prone shoulder ext x 20 2#  Supine SA punch x 20 2#    Patient participated in neuromuscular re-education activities to improve: Balance, Proprioception and vestibular function for 10 minutes. The following activities were included:  Tandem EC 30 mild - moderate sway with arms out to side.    SLS: arms out to side  L 30 seconds EC  R 13 seconds EC     Written Home Exercises Provided: none today. Reviewed current HEP, no questions and performing well.   Pt demo good understanding of the education provided. Jayne demonstrated good return demonstration of activities.     Assessment:   Pt has made progress while in therapy with noted increased in balance, strength, ROM, and VOR exercises. She would like to be discharged today and is appropriate to continuing with HEP. She is discharged today.     Patient has made fair - good progress towards established goals.    New/Revised goals: none    Short Term Goals:  4 weeks  1. Pt will demonstrate increased R cervical rotation by 5 degrees for increased ease with cervical rotation. - MET   2. Pt will report 1-2/10 intensity of headache during the day - NOT MET 2-3/10  3. Pt will report decreased duration of dizziness w/ turning to < 30 seconds - NOT MET  4. Pt will present with increased cervical strength to 5/5 for decreased stress to cervical spine. - MET   5. Pt will report decreased fullness in head with performing heavy straining exercises, core strengthening. - did not try     Long Term Goals: 8-12 weeks  1. Pt will demonstrate increased R cervical rotation by 10 degrees for increased ease with cervical rotation. - NOT MET  2. Pt will report without headache during the day - NOT MET unless she takes medicine  3. Pt will report decreased duration of dizziness w/ turning to < 10 seconds - not met  4. Pt will report  no fullness in head with performing heavy straining exercises, core strengthening. - did not try  5. Pt will be independent with HEP and self management of condiiton.    Plan:  Pt is discharged to [facility] today.

## 2018-09-19 ENCOUNTER — TELEPHONE (OUTPATIENT)
Dept: OBSTETRICS AND GYNECOLOGY | Facility: CLINIC | Age: 48
End: 2018-09-19

## 2018-09-19 DIAGNOSIS — Z12.31 ENCOUNTER FOR SCREENING MAMMOGRAM FOR BREAST CANCER: Primary | ICD-10-CM

## 2018-09-19 NOTE — TELEPHONE ENCOUNTER
----- Message from Myla Smith sent at 9/19/2018 12:52 PM CDT -----  Contact: [phone]  Pt is requesting orders for mammo.      Thank you!

## 2018-09-20 ENCOUNTER — HOSPITAL ENCOUNTER (OUTPATIENT)
Dept: RADIOLOGY | Facility: HOSPITAL | Age: 48
Discharge: HOME | End: 2018-09-20
Attending: OBSTETRICS & GYNECOLOGY
Payer: COMMERCIAL

## 2018-09-20 DIAGNOSIS — Z12.31 ENCOUNTER FOR SCREENING MAMMOGRAM FOR BREAST CANCER: ICD-10-CM

## 2018-09-20 PROCEDURE — 77067 SCR MAMMO BI INCL CAD: CPT | Mod: 26,,, | Performed by: RADIOLOGY

## 2018-09-20 PROCEDURE — 77067 SCR MAMMO BI INCL CAD: CPT | Mod: TC,PO

## 2018-09-20 PROCEDURE — 77063 BREAST TOMOSYNTHESIS BI: CPT | Mod: 26,,, | Performed by: RADIOLOGY

## 2018-09-26 ENCOUNTER — OFFICE VISIT (OUTPATIENT)
Dept: OBSTETRICS AND GYNECOLOGY | Facility: CLINIC | Age: 48
End: 2018-09-26
Attending: OBSTETRICS & GYNECOLOGY
Payer: COMMERCIAL

## 2018-09-26 VITALS
BODY MASS INDEX: 28.69 KG/M2 | SYSTOLIC BLOOD PRESSURE: 109 MMHG | WEIGHT: 156.88 LBS | DIASTOLIC BLOOD PRESSURE: 70 MMHG

## 2018-09-26 DIAGNOSIS — Z01.419 WELL WOMAN EXAM WITH ROUTINE GYNECOLOGICAL EXAM: Primary | ICD-10-CM

## 2018-09-26 DIAGNOSIS — Z98.890 HISTORY OF NONCHEMICAL TUBAL OCCLUSION: ICD-10-CM

## 2018-09-26 DIAGNOSIS — N95.1 PERIMENOPAUSAL: ICD-10-CM

## 2018-09-26 PROCEDURE — 99396 PREV VISIT EST AGE 40-64: CPT | Mod: S$GLB,,, | Performed by: OBSTETRICS & GYNECOLOGY

## 2018-09-26 PROCEDURE — 99999 PR PBB SHADOW E&M-EST. PATIENT-LVL II: CPT | Mod: PBBFAC,,, | Performed by: OBSTETRICS & GYNECOLOGY

## 2018-09-26 NOTE — PROGRESS NOTES
Jayne Mejia is a 48 y.o. female  who presents for annual exam.  Over the past year, periods have been spacing out with LMP 3/208.  She has noted some occasional hot flashes.  Recent mammogram was negative.  Essure tubal occlusion .  Denies recent changes in her medical / surgical history.  No GYN complaints.    Pap 8/10/17: Negative    Mammogram 18: Negative      Past Medical History:   Diagnosis Date    Bartholin cyst     History of abdominoplasty        Past Surgical History:   Procedure Laterality Date    Essure tubal occlusion      NOSE SURGERY      TUBAL LIGATION         OB History      Para Term  AB Living    1 1 1          SAB TAB Ectopic Multiple Live Births                       ROS:  GENERAL: Feeling well overall.   SKIN: Denies rash or lesions.   HEAD: Denies head injury or headache.   NODES: Denies enlarged lymph nodes.   CHEST: Denies chest pain or shortness of breath.   CARDIOVASCULAR: Denies palpitations or left sided chest pain.   ABDOMEN: No abdominal pain, nausea, vomiting or rectal bleeding.   URINARY: No dysuria or hematuria.  REPRODUCTIVE: See HPI.   BREASTS: Denies pain, lumps, or nipple discharge.   HEMATOLOGIC: No easy bruisability or excessive bleeding.   MUSCULOSKELETAL: Denies joint pain or swelling.   NEUROLOGIC: Denies syncope or weakness.   PSYCHIATRIC: Denies depression.    PE:   (chaperone present during entire exam)  APPEARANCE: Well nourished, well developed, in no acute distress.  BREASTS: Symmetrical, no skin changes or visible lesions. No palpable masses, nipple discharge or adenopathy bilaterally.  ABDOMEN: Soft. No tenderness or masses. No hernias. No CVA tenderness.  VULVA: No lesions. Normal female genitalia.  URETHRAL MEATUS: Normal size and location, no lesions, no prolapse.  URETHRA: No masses, tenderness, prolapse or scarring.  VAGINA: No lesions, no discharge, no significant cystocele or rectocele.  CERVIX: No lesions and discharge.    UTERUS: Normal size, regular shape, mobile, non-tender, bladder base nontender.  ADNEXA: No masses, tenderness or CDS nodularity.  ANUS PERINEUM: Normal.      Diagnosis:  1. Well woman exam with routine gynecological exam    2. Perimenopausal    3. History of nonchemical tubal occlusion          PLAN:         Patient was counseled today on perimenopausal issues and expected bleeding patterns.    Follow-up in 1 year.

## 2018-10-02 ENCOUNTER — TELEPHONE (OUTPATIENT)
Dept: INTERNAL MEDICINE | Facility: CLINIC | Age: 48
End: 2018-10-02

## 2018-10-02 ENCOUNTER — HOSPITAL ENCOUNTER (OUTPATIENT)
Dept: RADIOLOGY | Facility: HOSPITAL | Age: 48
Discharge: HOME | End: 2018-10-02
Attending: INTERNAL MEDICINE
Payer: COMMERCIAL

## 2018-10-02 DIAGNOSIS — R07.9 CHEST PAIN, UNSPECIFIED TYPE: Primary | ICD-10-CM

## 2018-10-02 DIAGNOSIS — R07.9 CHEST PAIN, UNSPECIFIED TYPE: ICD-10-CM

## 2018-10-02 PROCEDURE — 71046 X-RAY EXAM CHEST 2 VIEWS: CPT | Mod: TC,FY

## 2018-10-02 PROCEDURE — 71046 X-RAY EXAM CHEST 2 VIEWS: CPT | Mod: 26,,, | Performed by: RADIOLOGY

## 2018-10-02 NOTE — TELEPHONE ENCOUNTER
----- Message from Deanna Caraballo sent at 10/2/2018  8:51 AM CDT -----  Contact: PT ext [phone]  PT is requesting a chest Xray. Pt was seen for a fall and is still experiencing chest pain. Please call and advise.

## 2018-10-03 ENCOUNTER — OFFICE VISIT (OUTPATIENT)
Dept: DERMATOLOGY | Facility: CLINIC | Age: 48
End: 2018-10-03
Payer: COMMERCIAL

## 2018-10-03 DIAGNOSIS — L40.0 PSORIASIS VULGARIS: Primary | ICD-10-CM

## 2018-10-03 DIAGNOSIS — L80 VITILIGO: ICD-10-CM

## 2018-10-03 PROCEDURE — 99999 PR PBB SHADOW E&M-EST. PATIENT-LVL II: CPT | Mod: PBBFAC,,, | Performed by: DERMATOLOGY

## 2018-10-03 PROCEDURE — 99203 OFFICE O/P NEW LOW 30 MIN: CPT | Mod: S$GLB,,, | Performed by: DERMATOLOGY

## 2018-10-03 RX ORDER — FLUOCINONIDE TOPICAL SOLUTION USP, 0.05% 0.5 MG/ML
SOLUTION TOPICAL 2 TIMES DAILY
Qty: 60 ML | Refills: 1 | Status: SHIPPED | OUTPATIENT
Start: 2018-10-03 | End: 2021-06-28

## 2018-10-03 RX ORDER — TRIAMCINOLONE ACETONIDE 1 MG/G
CREAM TOPICAL 2 TIMES DAILY
Qty: 45 G | Refills: 1 | Status: SHIPPED | OUTPATIENT
Start: 2018-10-03 | End: 2021-06-28

## 2018-10-03 NOTE — PROGRESS NOTES
Subjective:       Patient ID:  Jayne Mejia is a 48 y.o. female who presents for   Chief Complaint   Patient presents with    Lesion     49 yo F presents for lesions to scalp for duration of 4 yrs, with itching and burning, treating with Ovace, Scalpicin, clobetasol soln-->no improvement.  No rash elsewhere on body.  She did notice a few light spots on her L wrist and under both arms, asymptomatic, no prior treatments    Family Hx: twin sister with ACD on hands (no h/o psoriasis)  Social Hx: cardiology nurse      Past Medical History:   Diagnosis Date    Bartholin cyst     History of abdominoplasty      Review of Systems   Musculoskeletal: Positive for arthralgias (hands).   Skin: Positive for itching, rash, dry skin and daily sunscreen use. Negative for tendency to form keloidal scars.   Hematologic/Lymphatic: Does not bruise/bleed easily.        Objective:    Physical Exam   Constitutional: She appears well-developed and well-nourished.   HENT:   Mouth/Throat: Lips normal.    Eyes: Lids are normal.    Neurological: She is alert and oriented to person, place, and time.   Psychiatric: She has a normal mood and affect.   Skin:   Areas Examined (abnormalities noted in diagram):   Scalp / Hair Palpated and Inspected  Head / Face Inspection Performed  Neck Inspection Performed  Chest / Axilla Inspection Performed  RUE Inspected  LUE Inspection Performed  Nails and Digits Inspection Performed                   Diagram Legend     Erythematous scaling macule/papule c/w actinic keratosis       Vascular papule c/w angioma      Pigmented verrucoid papule/plaque c/w seborrheic keratosis      Yellow umbilicated papule c/w sebaceous hyperplasia      Irregularly shaped tan macule c/w lentigo     1-2 mm smooth white papules consistent with Milia      Movable subcutaneous cyst with punctum c/w epidermal inclusion cyst      Subcutaneous movable cyst c/w pilar cyst      Firm pink to brown papule c/w dermatofibroma       Pedunculated fleshy papule(s) c/w skin tag(s)      Evenly pigmented macule c/w junctional nevus     Mildly variegated pigmented, slightly irregular-bordered macule c/w mildly atypical nevus      Flesh colored to evenly pigmented papule c/w intradermal nevus       Pink pearly papule/plaque c/w basal cell carcinoma      Erythematous hyperkeratotic cursted plaque c/w SCC      Surgical scar with no sign of skin cancer recurrence      Open and closed comedones      Inflammatory papules and pustules      Verrucoid papule consistent consistent with wart     Erythematous eczematous patches and plaques     Dystrophic onycholytic nail with subungual debris c/w onychomycosis     Umbilicated papule    Erythematous-base heme-crusted tan verrucoid plaque consistent with inflamed seborrheic keratosis     Erythematous Silvery Scaling Plaque c/w Psoriasis     See annotation    LABS 5/26/18   CBC normal  CMP normal (glucose 89)  Lipids normal      Assessment / Plan:        Psoriasis vulgaris-scalp-2-3% TBSA  Discussed Dx, mgmt options including topicals, NBUVB, oral medications such as MTX, and biologics   In patients with psoriasis, the prevalence rates of cardiovascular risk factors are increased, including hypertension, diabetes mellitus, dyslipidemia, obesity, and metabolic syndrome.  Continue to follow with PCP for mgmt of CV risk factors     Pt would like to continue topicals for now since she is not interested in SE of systemics at this time  -     fluocinonide (LIDEX) 0.05 % external solution; Apply topically 2 (two) times daily.  Dispense: 60 mL; Refill: 1    Vitiligo-early vitiligo?  KOH neg to r/o tinea versicolor  -     triamcinolone acetonide 0.1% (KENALOG) 0.1 % cream; Apply topically 2 (two) times daily. arms  Dispense: 45 g; Refill: 1           Follow-up in about 2 months (around 12/3/2018).

## 2018-11-14 ENCOUNTER — OFFICE VISIT (OUTPATIENT)
Dept: OPTOMETRY | Facility: CLINIC | Age: 48
End: 2018-11-14
Payer: COMMERCIAL

## 2018-11-14 DIAGNOSIS — H43.393 VITREOUS FLOATERS, BILATERAL: ICD-10-CM

## 2018-11-14 DIAGNOSIS — H10.503 BLEPHAROCONJUNCTIVITIS OF BOTH EYES, UNSPECIFIED BLEPHAROCONJUNCTIVITIS TYPE: ICD-10-CM

## 2018-11-14 DIAGNOSIS — Z01.00 EXAMINATION OF EYES AND VISION: Primary | ICD-10-CM

## 2018-11-14 DIAGNOSIS — H52.4 MYOPIA WITH ASTIGMATISM AND PRESBYOPIA, BILATERAL: ICD-10-CM

## 2018-11-14 DIAGNOSIS — H52.13 MYOPIA WITH ASTIGMATISM AND PRESBYOPIA, BILATERAL: ICD-10-CM

## 2018-11-14 DIAGNOSIS — H52.203 MYOPIA WITH ASTIGMATISM AND PRESBYOPIA, BILATERAL: ICD-10-CM

## 2018-11-14 PROCEDURE — 92014 COMPRE OPH EXAM EST PT 1/>: CPT | Mod: S$GLB,,, | Performed by: OPTOMETRIST

## 2018-11-14 PROCEDURE — 92015 DETERMINE REFRACTIVE STATE: CPT | Mod: S$GLB,,, | Performed by: OPTOMETRIST

## 2018-11-14 PROCEDURE — 99999 PR PBB SHADOW E&M-EST. PATIENT-LVL III: CPT | Mod: PBBFAC,,, | Performed by: OPTOMETRIST

## 2018-11-14 RX ORDER — PREGABALIN 25 MG/1
CAPSULE ORAL
COMMUNITY
Start: 2018-10-15 | End: 2019-01-04

## 2018-11-14 RX ORDER — GABAPENTIN 100 MG/1
CAPSULE ORAL
COMMUNITY
Start: 2018-09-28 | End: 2019-01-04

## 2018-11-14 RX ORDER — LOTEPREDNOL ETABONATE 5 MG/ML
1 SUSPENSION/ DROPS OPHTHALMIC 2 TIMES DAILY PRN
Qty: 5 ML | Refills: 2 | Status: SHIPPED | OUTPATIENT
Start: 2018-11-14 | End: 2019-11-14

## 2018-11-14 NOTE — PROGRESS NOTES
HPI     Annual Exam      Additional comments: DLE 6-16 (walker)   ocular health exam               Blurred Vision      Additional comments: slight decrease at distance --- near [facility] good              Dry Eye      Additional comments: +Visine gtts - OU              Spots and/or Floaters      Additional comments: OU -- no light flashes              Comments     Agree above  Notes redness and issues waking in am // dry eye  ? Blepharitis  Still using latisse, but not every day  Gets refills from derm?            Last edited by ISHMAEL Rubio, OD on 11/14/2018  4:21 PM. (History)        ROS     Positive for: Eyes    Negative for: Constitutional, Gastrointestinal, Neurological, Skin,   Genitourinary, Musculoskeletal, HENT, Endocrine, Cardiovascular,   Respiratory, Psychiatric, Allergic/Imm, Heme/Lymph    Last edited by ISHMAEL Rubio, OD on 11/14/2018  2:42 PM. (History)        Assessment /Plan     For exam results, see Encounter Report.    Examination of eyes and vision    Myopia with astigmatism and presbyopia, bilateral    Vitreous floaters, bilateral    Blepharoconjunctivitis of both eyes, unspecified blepharoconjunctivitis type  -     loteprednol (LOTEMAX) 0.5 % ophthalmic suspension; Place 1 drop into both eyes 2 (two) times daily as needed (to control inflammation).  Dispense: 5 mL; Refill: 2      1. Ocular health exam OU  2. Updated specs rx, gave copy  3. RD precautions given, reviewed  4. Longstanding/ seasonal bleph/ dry eye  Rx lotemax and prn use over next 1-2 weeks  Knows to call if not effective    Discussed and educated patient on current findings /plan.  RTC 1 year, prn if any changes / issues

## 2018-11-14 NOTE — PROGRESS NOTES
HPI     Annual Exam      Additional comments: DLE 6-16 (walker)   ocular health exam               Blurred Vision      Additional comments: slight decrease at distance --- near [facility] good              Dry Eye      Additional comments: +Visine gtts - OU              Spots and/or Floaters      Additional comments: OU -- no light flashes              Comments     Agree above  Notes redness and issues waking in am // dry eye  ? Blepharitis            Last edited by ISHMAEL Rubio, OD on 11/14/2018  2:42 PM. (History)        ROS     Positive for: Eyes    Negative for: Constitutional, Gastrointestinal, Neurological, Skin,   Genitourinary, Musculoskeletal, HENT, Endocrine, Cardiovascular,   Respiratory, Psychiatric, Allergic/Imm, Heme/Lymph    Last edited by ISHMAEL Rubio, OD on 11/14/2018  2:42 PM. (History)        Assessment /Plan     For exam results, see Encounter Report.    Examination of eyes and vision    Myopia with astigmatism and presbyopia, bilateral    Vitreous floaters, bilateral    Blepharoconjunctivitis of both eyes, unspecified blepharoconjunctivitis type  -     loteprednol (LOTEMAX) 0.5 % ophthalmic suspension; Place 1 drop into both eyes 2 (two) times daily as needed (to control inflammation).  Dispense: 5 mL; Refill: 2    Glaucoma screening      ***

## 2018-12-11 ENCOUNTER — HOSPITAL ENCOUNTER (EMERGENCY)
Facility: HOSPITAL | Age: 48
Discharge: HOME | End: 2018-12-11
Attending: EMERGENCY MEDICINE
Payer: COMMERCIAL

## 2018-12-11 ENCOUNTER — LAB VISIT (OUTPATIENT)
Dept: LAB | Facility: HOSPITAL | Age: 48
End: 2018-12-11
Attending: INTERNAL MEDICINE
Payer: COMMERCIAL

## 2018-12-11 ENCOUNTER — NURSE TRIAGE (OUTPATIENT)
Dept: ADMINISTRATIVE | Facility: CLINIC | Age: 48
End: 2018-12-11

## 2018-12-11 VITALS
RESPIRATION RATE: 64 BRPM | HEIGHT: 63 IN | BODY MASS INDEX: 27.46 KG/M2 | TEMPERATURE: 98 F | WEIGHT: 155 LBS | DIASTOLIC BLOOD PRESSURE: 67 MMHG | OXYGEN SATURATION: 100 % | HEART RATE: 83 BPM | SYSTOLIC BLOOD PRESSURE: 108 MMHG

## 2018-12-11 DIAGNOSIS — R51.9 HEADACHE: ICD-10-CM

## 2018-12-11 DIAGNOSIS — Z12.11 COLON CANCER SCREENING: ICD-10-CM

## 2018-12-11 PROCEDURE — 63600175 PHARM REV CODE 636 W HCPCS: Performed by: PHYSICIAN ASSISTANT

## 2018-12-11 PROCEDURE — 25000003 PHARM REV CODE 250: Performed by: PHYSICIAN ASSISTANT

## 2018-12-11 PROCEDURE — 82274 ASSAY TEST FOR BLOOD FECAL: CPT

## 2018-12-11 PROCEDURE — 99284 EMERGENCY DEPT VISIT MOD MDM: CPT | Mod: 25

## 2018-12-11 PROCEDURE — 96374 THER/PROPH/DIAG INJ IV PUSH: CPT

## 2018-12-11 PROCEDURE — 99284 EMERGENCY DEPT VISIT MOD MDM: CPT | Mod: ,,, | Performed by: EMERGENCY MEDICINE

## 2018-12-11 RX ORDER — METOCLOPRAMIDE HYDROCHLORIDE 5 MG/ML
10 INJECTION INTRAMUSCULAR; INTRAVENOUS
Status: COMPLETED | OUTPATIENT
Start: 2018-12-11 | End: 2018-12-11

## 2018-12-11 RX ORDER — AMITRIPTYLINE HYDROCHLORIDE 25 MG/1
25 TABLET, FILM COATED ORAL NIGHTLY
Qty: 30 TABLET | Refills: 0 | Status: SHIPPED | OUTPATIENT
Start: 2018-12-11 | End: 2019-01-04

## 2018-12-11 RX ORDER — ACETAMINOPHEN 500 MG
500 TABLET ORAL
Status: COMPLETED | OUTPATIENT
Start: 2018-12-11 | End: 2018-12-11

## 2018-12-11 RX ORDER — METHYLPREDNISOLONE SOD SUCC 125 MG
125 VIAL (EA) INJECTION
Status: COMPLETED | OUTPATIENT
Start: 2018-12-11 | End: 2018-12-11

## 2018-12-11 RX ADMIN — METHYLPREDNISOLONE SODIUM SUCCINATE 125 MG: 125 INJECTION, POWDER, FOR SOLUTION INTRAMUSCULAR; INTRAVENOUS at 10:12

## 2018-12-11 RX ADMIN — SODIUM CHLORIDE 1000 ML: 0.9 INJECTION, SOLUTION INTRAVENOUS at 10:12

## 2018-12-11 RX ADMIN — ACETAMINOPHEN 500 MG: 500 TABLET ORAL at 10:12

## 2018-12-11 RX ADMIN — METOCLOPRAMIDE 10 MG: 5 INJECTION, SOLUTION INTRAMUSCULAR; INTRAVENOUS at 10:12

## 2018-12-11 NOTE — TELEPHONE ENCOUNTER
Pt reported she hit her head in April 2018. Has been seen by pcp and by neurology. Has had several tests done. Today reported she has a pounding in her head with some nausea/dizziness . Has numbness in teeth which has been ongoing x 3 months. 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 Has not mentioned any of her sx's to the drs she is working with today.    Reason for Disposition   Nursing judgment    Protocols used: ST NO PROTOCOL AVAILABLE - SICK ADULT-A-[place]

## 2018-12-12 LAB — HEMOCCULT STL QL IA: NEGATIVE

## 2018-12-12 NOTE — ED NOTES
Patient identifiers verified and correct for Ms Mejia  C/C: Headache after head injury in April, jaw numbness,   APPEARANCE: awake and alert in NAD.  SKIN: warm, dry and intact. No breakdown or bruising.  MUSCULOSKELETAL: Patient moving all extremities spontaneously, no obvious swelling or deformities noted. Ambulates independently.  RESPIRATORY: Denies shortness of breath.Respirations unlabored. Denies cough  CARDIAC: Denies CP, 2+ distal pulses; no peripheral edema  ABDOMEN: S/ND/NT, Denies nausea  : voids spontaneously, denies difficulty  Neurologic: AAO x 4; follows commands equal strength in all extremities; denies numbness/tingling. Denies dizziness, positive lightheaded,  Pain in back of head and neck. Denies weakness

## 2018-12-12 NOTE — ED PROVIDER NOTES
Encounter Date: 12/11/2018       History     Chief Complaint   Patient presents with    Headache     pt reports concussion in april and has had CT scans and has seen neuro for eval; reports worsening of occipital pressure that began sunday; denies vision changes, denies dizziness while still; reports dizziness with quick movements     48 year old female presenting to the ED with the chief complaint of a headache. Patient reports the headache began after a ground level fall in 4/2018. She describes the headache as a constant dull sensation located in her occiput that radiates down to the posterior base of her neck. She reports the headache has increased from a 2/10 to 5/10 over the past 2 days and reports a new numbness sensation in her jaw and tongue. She reports having mild relief with NSAIDs. Patient has been evaluated by Neurology at [facility] and Neurology at  for her headache and has had CTH and MRI brain and neck previously. She reports imaging showed a congenital venous hemangioma. She reports trying Elavil, Lyrica, and Gabapentin for her headache, but self discontinued 2/2 drowsiness. 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 She reports no deficits with her dexterity during her shift. She denies clumsiness, vision changes, speech changes, dizziness, unilateral extremity weakness.           Review of patient's allergies indicates:   Allergen Reactions    Bactrim [sulfamethoxazole-trimethoprim] Other (See Comments)     Top lip swelling     Past Medical History:   Diagnosis Date    Bartholin cyst     History of abdominoplasty      Past Surgical History:   Procedure Laterality Date    Essure tubal occlusion      NOSE SURGERY      TUBAL LIGATION       Family History   Problem Relation Age of Onset    Breast cancer Paternal Grandmother     Cancer Paternal Grandmother     Colon cancer Paternal Grandmother     Heart attack Maternal Grandfather     Hypertension  Maternal Grandmother     Diabetes Maternal Grandmother     Breast cancer Maternal Grandmother     Hashimoto's thyroiditis Sister     Ovarian cancer Neg Hx      Social History     Tobacco Use    Smoking status: Never Smoker    Smokeless tobacco: Never Used   Substance Use Topics    Alcohol use: No    Drug use: No     Review of Systems   Constitutional: Negative for chills, diaphoresis and fever.   HENT: Negative for congestion, sore throat and trouble swallowing.    Eyes: Negative for pain and redness.   Respiratory: Negative for cough and shortness of breath.    Cardiovascular: Negative for chest pain.   Gastrointestinal: Negative for abdominal pain, diarrhea, nausea and vomiting.   Genitourinary: Negative for dysuria and hematuria.   Musculoskeletal: Positive for neck pain. Negative for neck stiffness.   Skin: Negative for wound.   Neurological: Positive for numbness (jaw, tongue) and headaches. Negative for seizures, speech difficulty and weakness.     Physical Exam     Initial Vitals [12/11/18 2012]   BP Pulse Resp Temp SpO2   139/69 83 18 96.9 °F (36.1 °C) 99 %      MAP       --         Physical Exam    Constitutional: She appears well-developed and well-nourished. She is not diaphoretic. No distress.   HENT:   Head: Normocephalic and atraumatic.   Mouth/Throat: Oropharynx is clear and moist. No oropharyngeal exudate.   Eyes: EOM are normal. Pupils are equal, round, and reactive to light.   Neck: Normal range of motion. Neck supple.   Cardiovascular: Normal rate and regular rhythm.   Pulmonary/Chest: Breath sounds normal. No respiratory distress. She has no wheezes.   Abdominal: Soft. There is no tenderness.   Musculoskeletal: Normal range of motion. She exhibits no tenderness.   Neurological: She is alert and oriented to person, place, and time. She has normal strength. No cranial nerve deficit or sensory deficit.   Negative pronator drift. Ambulates without difficulty. Normal finger-to-nose,  heel-to-shin.    Skin: Skin is warm and dry. No erythema.       ED Course   Procedures  Labs Reviewed - No data to display       Imaging Results    None                APC / Resident Notes:   48 year old female presenting to the ED c/o chronic headache after fall in 4/2018. Patient has been evaluated by Neurology at [facility] and  regarding her headache. No new trauma or other head injuries since the original fall. Patient has had CT and MRI done for evaluation that showed incidental congenital venous hemangioma. DDx includes but not limited to post-concussive syndrome, migraine, dehydration, tension headache, sinus headache, cluster headache. I have considered but do not suspect CVA or ICH. Will give headache cocktail.     11:03 PM  Patient stable on reassessment. She reports moderate improvement in her headache. She remains neurovascularly intact and able to ambulate without difficulty. Suspect headache is post-concussive in nature. Advised patient to follow-up with concussion clinic. Ambulatory referral placed. Will refill prior RX for Elavil and counseled patient on appropriate dosing. Counseled patient on health risks associated with Elavil. Patient expressed understanding and agreeable to the plan. I have advised the patient to follow-up with their PCP. Return to ED precautions given for new, worsening, or concerning symptoms. I have discussed the care of this patient with my supervising physician.                  Clinical Impression:   The encounter diagnosis was Headache.      Disposition:   Disposition: Discharged  Condition: Stable                        Meño Salcedo PA-C  12/11/18 [id number]

## 2018-12-12 NOTE — ED NOTES
Bed: REC 04  Expected date: 12/11/18  Expected time: 9:34 PM  Means of arrival:   Comments:  intk 3

## 2018-12-12 NOTE — ED TRIAGE NOTES
"Patient states she had a concussion in April, saw Neurologist in May and a neurologist at [facility]. States her symptoms have continued, states "swin head" pain went from 2-10 on Sunday. States her teeth are numb on both top and bottom x 4 months. States he has a venous hemangioma in her brain. CT scan and MRI on disc.   "

## 2018-12-13 ENCOUNTER — TELEPHONE (OUTPATIENT)
Dept: NEUROLOGY | Facility: CLINIC | Age: 48
End: 2018-12-13

## 2018-12-13 NOTE — TELEPHONE ENCOUNTER
"----- Message from Oskar Pendleton MA sent at 12/13/2018 10:58 AM CST -----  Contact: - Dr. Mejia  Is this something Dr. Fu would see? If not, who can I refer them to?    Oskar Pendleton   Sports Medicine Assistant   [facility]     ----- Message -----  From: Latonya Jayden  Sent: 12/13/2018   9:41 AM  To: Corazon JAY Staff     says patient had concussion in April, and still has a "swampy head" with worsening pressure.  Was seen at ER for persistent symptoms and ED referred patient to be seen at [facility]  [phone] or [phone]    "

## 2018-12-19 ENCOUNTER — OFFICE VISIT (OUTPATIENT)
Dept: OTOLARYNGOLOGY | Facility: CLINIC | Age: 48
End: 2018-12-19
Payer: COMMERCIAL

## 2018-12-19 VITALS — WEIGHT: 161.63 LBS | BODY MASS INDEX: 29.74 KG/M2 | HEIGHT: 62 IN

## 2018-12-19 DIAGNOSIS — F07.81 POSTCONCUSSIVE SYNDROME: ICD-10-CM

## 2018-12-19 DIAGNOSIS — R20.2 FACIAL PARESTHESIA: Primary | ICD-10-CM

## 2018-12-19 PROCEDURE — 3008F BODY MASS INDEX DOCD: CPT | Mod: CPTII,S$GLB,, | Performed by: OTOLARYNGOLOGY

## 2018-12-19 PROCEDURE — 99999 PR PBB SHADOW E&M-EST. PATIENT-LVL II: CPT | Mod: PBBFAC,,, | Performed by: OTOLARYNGOLOGY

## 2018-12-19 PROCEDURE — 99203 OFFICE O/P NEW LOW 30 MIN: CPT | Mod: S$GLB,,, | Performed by: OTOLARYNGOLOGY

## 2018-12-19 NOTE — PROGRESS NOTES
"Subjective:       Patient ID: Jayne Mejia is a 48 y.o. female.    Chief Complaint: teeth are numb and Mouth Injury    Jayne is here for jaw issues.   Length of symptoms: 4 months, has been stable over this time.   She had a fall in April and fell on her jaw. Had LOC. Has a significant lip laceration. Initially started as headache, but this began in a delayed fashion (2 weeks after) and started when she felt something "pop" in the mouth and it caused these things. Headache is worse at night. She denies dysphagia but reports. Has persistent mild dizziness following fall with pressure up the back of the head.   Did Vestibular / concussion PT but has not continued exercises at home. Hasn't felt better. Headache is generally manageable now but she was concerned when she had onset of these newer numbness symptoms which began in a very delayed fashion.  Therapies tried include: Elavil (she is not liking it) and is decreasing dose, took Gabapentin for just a few days but didn't like the way it made her feel.       Social History     Tobacco Use   Smoking Status Never Smoker   Smokeless Tobacco Never Used     Social History     Substance and Sexual Activity   Alcohol Use No          Review of Systems   Constitutional: Negative for activity change and appetite change.   Eyes: Negative for discharge.   Respiratory: Negative for difficulty breathing and wheezing   Cardiovascular: Negative for chest pain.   Gastrointestinal: Negative for abdominal distention and abdominal pain.   Endocrine: Negative for cold intolerance and heat intolerance.   Genitourinary: Negative for dysuria.   Musculoskeletal: Negative for gait problem and joint swelling.   Skin: Negative for color change and pallor.   Neurological: Negative for syncope and weakness.   Psychiatric/Behavioral: Negative for agitation and confusion.     Objective:        Constitutional:   She is oriented to person, place, and time. She appears well-developed and " well-nourished. She appears alert. She is active.   PERRLA, EOMI Normal speech.      Head:  Normocephalic and atraumatic. Head is without TMJ tenderness. No scars. Salivary glands normal.  Facial strength is normal.      Ears:    Right Ear: No drainage or swelling. No middle ear effusion.   Left Ear: No drainage or swelling.  No middle ear effusion.     Nose:  No mucosal edema, rhinorrhea or sinus tenderness. No turbinate hypertrophy.      Mouth/Throat  Oropharynx clear and moist without lesions or asymmetry, normal uvula midline and mirror exam normal. Normal dentition. No uvula swelling, lacerations or trismus. No oropharyngeal exudate. Tonsillar erythema, tonsillar exudate.    Normal occlusion  No paresthesia of V1-3 bilaterally      Neck:  Full range of motion with neck supple and no adenopathy. Thyroid tenderness is present. No tracheal deviation, no edema, no erythema, normal range of motion, no stridor, no crepitus and no neck rigidity present. No thyroid mass present.     Cardiovascular:   Intact distal pulses and normal pulses.      Pulmonary/Chest:   Effort normal and breath sounds normal. No stridor.     Psychiatric:   Her speech is normal and behavior is normal. Her mood appears not anxious. Her affect is not labile.     Neurological:   She is alert and oriented to person, place, and time. No sensory deficit.     Skin:   No abrasions, lacerations, lesions, or rashes. No abrasion and no bruising noted.         Tests / Results:  I personally reviewed the MRI Brain and my findings reveal:   Normal Brain  Normal facial structures with no enhancement or abnormality along course of trigeminal nerves.  Sinuses normal appearing     Assessment:       1. Facial paresthesia    2. Postconcussive syndrome          Plan:         I do not see an abnormality that would explain the onset of her numbness symptoms. Her imaging did precede the onset of this, but there was no fracture or abnormality at the time and this  would likely have been seen then. She is managing with it and is OK with watching for now. I would be OK with watching as long as not getting worse. If persistent or worsening over next 6 months, would consider re-imaging Face MRI with thinner cuts to evaluate the area.

## 2019-01-04 ENCOUNTER — OFFICE VISIT (OUTPATIENT)
Dept: NEUROLOGY | Facility: CLINIC | Age: 49
End: 2019-01-04
Payer: COMMERCIAL

## 2019-01-04 VITALS
HEART RATE: 77 BPM | SYSTOLIC BLOOD PRESSURE: 115 MMHG | HEIGHT: 62 IN | BODY MASS INDEX: 30.55 KG/M2 | DIASTOLIC BLOOD PRESSURE: 61 MMHG | WEIGHT: 166 LBS

## 2019-01-04 DIAGNOSIS — H81.90 VESTIBULOPATHY, UNSPECIFIED LATERALITY: ICD-10-CM

## 2019-01-04 DIAGNOSIS — S06.9XAS COGNITIVE AND NEUROBEHAVIORAL DYSFUNCTION FOLLOWING BRAIN INJURY: ICD-10-CM

## 2019-01-04 DIAGNOSIS — G31.89 COGNITIVE AND NEUROBEHAVIORAL DYSFUNCTION FOLLOWING BRAIN INJURY: ICD-10-CM

## 2019-01-04 DIAGNOSIS — F07.81 POSTCONCUSSIVE SYNDROME: Primary | ICD-10-CM

## 2019-01-04 DIAGNOSIS — F09 COGNITIVE AND NEUROBEHAVIORAL DYSFUNCTION FOLLOWING BRAIN INJURY: ICD-10-CM

## 2019-01-04 DIAGNOSIS — S06.0X1S CONCUSSION WITH LOSS OF CONSCIOUSNESS OF 30 MINUTES OR LESS, SEQUELA: ICD-10-CM

## 2019-01-04 DIAGNOSIS — S13.4XXA WHIPLASH, INITIAL ENCOUNTER: ICD-10-CM

## 2019-01-04 DIAGNOSIS — R41.9 ALTERATION OF AWARENESS: ICD-10-CM

## 2019-01-04 DIAGNOSIS — G44.86 CERVICOGENIC HEADACHE: ICD-10-CM

## 2019-01-04 PROCEDURE — 99999 PR PBB SHADOW E&M-EST. PATIENT-LVL III: CPT | Mod: PBBFAC,,, | Performed by: PSYCHIATRY & NEUROLOGY

## 2019-01-04 PROCEDURE — 99215 PR OFFICE/OUTPT VISIT, EST, LEVL V, 40-54 MIN: ICD-10-PCS | Mod: S$GLB,,, | Performed by: PSYCHIATRY & NEUROLOGY

## 2019-01-04 PROCEDURE — 99215 OFFICE O/P EST HI 40 MIN: CPT | Mod: S$GLB,,, | Performed by: PSYCHIATRY & NEUROLOGY

## 2019-01-04 PROCEDURE — 99999 PR PBB SHADOW E&M-EST. PATIENT-LVL III: ICD-10-PCS | Mod: PBBFAC,,, | Performed by: PSYCHIATRY & NEUROLOGY

## 2019-01-04 PROCEDURE — 3008F BODY MASS INDEX DOCD: CPT | Mod: CPTII,S$GLB,, | Performed by: PSYCHIATRY & NEUROLOGY

## 2019-01-04 PROCEDURE — 3008F PR BODY MASS INDEX (BMI) DOCUMENTED: ICD-10-PCS | Mod: CPTII,S$GLB,, | Performed by: PSYCHIATRY & NEUROLOGY

## 2019-01-04 NOTE — PROGRESS NOTES
Subjective:       Patient ID: Jayne Mejia is a 48 y.o. female.    Reason for Consult: Concussion      Interval History:  Jayne Mejia is here for follow up after having seen Dr. Mckeon. Their condition is clinically stable.  We have discussed that her initial injury was a concussion with loss of consciousness that occurred on April 18, 2018.  She notes that she fell forward after standing up and being dizzy after she took Bactrim Benadryl.  She hit her chin on the tile and was unconscious for an unknown amount of time but she suspects less than 30 min.  She had seen my colleague who referred her for physical therapy but it appears that she was discharged to home exercise prior to having met all goals and physical therapy.  Apparently there was some dry needling but there is no specific work addressing her vestibulopathy.  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  She notes her main issue is that she almost always feels dizzy and she notes when she turns her head left looking at her blind spot during her commute she becomes a little bit more dizzy.      Objective:     Vitals:    01/04/19 1309   BP: 115/61   Pulse: 77     [place]-Hallpike positive to the left.  Yamilet is abnormal 7/8/6, indicating vestibulopathy.  Point of convergence is less than 20 cm, normal. Cranial nerves 2-12 without focal deficits.  Strength is 5/5 in all 4 extremities.  Gait and station within normal limits.  Reflex 2+ in bilateral upper and lower extremities. Cranial nerves 2-12 without focal deficit.  Focused examination was undertaken today. Over 50% of face to face time of 40 minute visit time was in giving guidance, counseling and discussing treatment options.    Results for orders placed or performed during the hospital encounter of 05/22/18   CT Head  Without Contrast    Narrative    EXAMINATION:  CT HEAD WITHOUT CONTRAST    CLINICAL HISTORY:  Headache, post trauma; Other chest pain    TECHNIQUE:  Low dose axial CT images obtained throughout the head without intravenous contrast. Sagittal and coronal reconstructions were performed.    COMPARISON:  None.    FINDINGS:  Intracranial compartment:    Ventricles and sulci are normal in size for age without evidence of hydrocephalus.    No extra-axial blood or fluid collections.    The brain parenchyma appears normal. No parenchymal mass, hemorrhage, edema or major vascular distribution infarct.    Skull/extracranial contents (limited evaluation):    No fracture or significant extracalvarial soft tissue injury.  Mastoid air cells and paranasal sinuses are essentially clear.      Impression    No evidence of acute intracranial pathology.    Electronically signed by resident: Steve Solano  Date:    05/22/2018  Time:    09:09    Electronically signed by: Parish Talamantes MD  Date:    05/22/2018  Time:    12:47       Assessment/Plan:     Problem List Items Addressed This Visit        Neuro    Postconcussive syndrome - Primary    Relevant Orders    Ambulatory Referral to Physical/Occupational Therapy      Other Visit Diagnoses     Concussion with loss of consciousness of 30 minutes or less, sequela        Relevant Orders    Ambulatory Referral to Physical/Occupational Therapy    Cervicogenic headache        Relevant Orders    Ambulatory Referral to Physical/Occupational Therapy    Whiplash, initial encounter        Relevant Orders    Ambulatory Referral to Physical/Occupational Therapy    Vestibulopathy, unspecified laterality        Relevant Orders    Ambulatory Referral to Physical/Occupational Therapy    Cognitive and neurobehavioral dysfunction following brain injury        Alteration of awareness            48-year-old right-handed female presents for evaluation of concussion with loss of consciousness in April of 2018.   At this time she has an examination that is consistent with left-sided vestibulopathy, whiplash and postconcussion syndrome.  I will refer her to physical therapy specifically for the vestibulopathy in the whiplash.  She has a [facility] for [facility] so I will send her to them for further evaluation and treatment.  For now I have asked her to track her headaches via a digital headache application and we can see how she does moving forward.  Depending on the results I would consider a daily preventive agent for her headaches if they are actually affecting her more negatively then she initially thinks.  She has had recent imaging of her brain and her cervical spine as of October of 2018.  These have been scanned as prior MRIs into our medical system.  I have observed a myself and there appears to be no acute intervention necessary related to the result on either scan.  I will follow up with them in 2 month(s).  The patient verbalizes understanding and agreement with the treatment plan. I have discussed risks, benefits and alternatives to the treatment plan. Questions were sought and answered to her stated verbal satisfaction.        Karelne Fu MD    This note is dictated on M*Modal Fluency Direct word recognition program. There are word recognition mistakes that are occasionally missed on review.

## 2019-01-04 NOTE — LETTER
January 4, 2019      Meño Salcedo PA-C  [address] [facility] [address]           [facility] - Neurology  [address] [address]  Phone: [phone]  Fax: [phone]          Patient: Jayne Mejia   MR Number: [id number]   YOB: 1970   Date of Visit: 1/4/2019       Dear Meño Salcedo:    Thank you for referring Jayne Mejia to me for evaluation. Attached you will find relevant portions of my assessment and plan of care.    If you have questions, please do not hesitate to call me. I look forward to following Jayne Mejia along with you.    Sincerely,    Jewel Fu III, MD    Enclosure  CC:  No Recipients    If you would like to receive this communication electronically, please contact [email or web address] or [phone] to request more information on [employer] Link access.    For providers and/or their staff who would like to refer a patient to [facility], please contact us through our one-stop-shop provider referral line, [facility], [place], at [phone].    If you feel you have received this communication in error or would no longer like to receive these types of communications, please e-mail [email or web address]

## 2019-03-08 ENCOUNTER — PATIENT MESSAGE (OUTPATIENT)
Dept: NEUROLOGY | Facility: CLINIC | Age: 49
End: 2019-03-08

## 2019-03-08 NOTE — PROGRESS NOTES
53+ DA scheduled 3/19/19 @ 0900 w/ Linnea HELTON   [facility] Outpatient Physical Therapy Daily Note    Name: Jayne Mejia  Clinic Number: [id number]  Date of Treatment: 7/13/2018   Diagnosis:   Encounter Diagnosis   Name Primary?    Dizziness        Visit number: 2  Start date: 7/6/18  POC End date: 9/28/18    Time in: 8:10 am  Time Out: 9:10 am  Total Treatment Time: 45    Precautions: fall, standard    Subjective:    Jayne reports improvement of symptoms noted through decreased incidence of episodes where she needs to hold on to something to keep her balance. She reports increased ease with turning and less dizziness with a quick turn. She does reports she has increased symptoms with lying on her L side at night and has some dizziness in this position. Patient reports their pain to be 3/10 on a 0-10 scale with 0 being no pain and 10 being the worst pain imaginable.    Objective    Patient participated in neuromuscular re-education activities to improve: Balance, Proprioception and vestibular function for 35 minutes. The following activities were included:   Seated 4 feet in room with door open and facing gym:  -VOR x 1 horizontal no increase in symptoms with target 3.5 feet away, vertical foggy - 33 second recovery; diag to R; 30 second recovery with diagonals to L.  -VOR x 2 horizontal increased dizziness, decreased approx 1 minute 46 seconds but not back to baseline for today; vertical 1 minute 9 seconds      Firm EC 29 seconds opened eyes, mild sway posterior and R lateral  Compliant EO 30 seconds with mild sway  Compliant EC 30 seconds with arms out from side and with moderate sway.     Tandem EO 30 seconds no sway  Tandem EC 30 mild - moderate sway with arms out to side.      SLS: arms out to side  L 30 seconds EC  R 13 seconds EC     Jayne received the following manual therapy techniques: Myofacial release were applied to the: cervical paraspinals, upper trap, suboccipital release for 10 minutes.     Written Home Exercises Provided: continue  with current and added levator stretch  Pt demo good understanding of the education provided. Jayne demonstrated good return demonstration of activities.     Assessment:   Did not progress with diagonals VOR x 2 exercises due to increased symptoms with horizontal and vertical directions. She demonstrates improved balance with tandem with eyes closed, able to hold 30 seconds but with moderate sway.     Pt will continue to benefit from skilled PT intervention. Medical Necessity is demonstrated by:  Fall Risk, Unable to participate fully in daily activities, Requires skilled supervision to complete and progress HEP and decreased vestibular function.    Patient is making good progress towards established goals.    New/Revised goals: none    Short Term Goals:  4 weeks  1. Pt will demonstrate increased R cervical rotation by 5 degrees for increased ease with cervical rotation.   2. Pt will report 1-2/10 intensity of headache during the day  3. Pt will report decreased duration of dizziness w/ turning to < 30 seconds  4. Pt will present with increased cervical strength to 5/5 for decreased stress to cervical spine.   5. Pt will report decreased fullness in head with performing heavy straining exercises, core strengthening.      Long Term Goals: 8-12 weeks  1. Pt will demonstrate increased R cervical rotation by 10 degrees for increased ease with cervical rotation.   2. Pt will report without headache during the day  3. Pt will report decreased duration of dizziness w/ turning to < 10 seconds   4. Pt will report no fullness in head with performing heavy straining exercises, core strengthening.   5. Pt will be independent with HEP and self management of symptoms.     Plan:  Continue with established Plan of Care towards PT goals.

## 2019-09-12 ENCOUNTER — TELEPHONE (OUTPATIENT)
Dept: NEUROLOGY | Facility: CLINIC | Age: 49
End: 2019-09-12

## 2019-09-12 NOTE — TELEPHONE ENCOUNTER
----- Message from Ame Mendez sent at 9/12/2019 11:42 AM CDT -----  Contact: IRMA SMILEY   Name of Who is Calling: IRMA SMILEY       What is the request in detail: Patient is requesting a call back she states she is still having headaches and constant pressure she wants to see if she can be soon or what should she do       Can the clinic reply by [facility]: NO      What Number to Call Back if not in [facility]: [phone]

## 2019-09-24 ENCOUNTER — PATIENT MESSAGE (OUTPATIENT)
Dept: NEUROLOGY | Facility: CLINIC | Age: 49
End: 2019-09-24

## 2019-09-24 ENCOUNTER — OFFICE VISIT (OUTPATIENT)
Dept: INTERNAL MEDICINE | Facility: CLINIC | Age: 49
End: 2019-09-24
Payer: COMMERCIAL

## 2019-09-24 VITALS
WEIGHT: 146.38 LBS | HEIGHT: 63 IN | DIASTOLIC BLOOD PRESSURE: 80 MMHG | BODY MASS INDEX: 25.94 KG/M2 | SYSTOLIC BLOOD PRESSURE: 114 MMHG

## 2019-09-24 DIAGNOSIS — Z12.31 ENCOUNTER FOR SCREENING MAMMOGRAM FOR BREAST CANCER: ICD-10-CM

## 2019-09-24 DIAGNOSIS — Z01.419 ENCOUNTER FOR ANNUAL ROUTINE GYNECOLOGICAL EXAMINATION: ICD-10-CM

## 2019-09-24 DIAGNOSIS — Z12.11 COLON CANCER SCREENING: ICD-10-CM

## 2019-09-24 DIAGNOSIS — Z00.00 PHYSICAL EXAM: Primary | ICD-10-CM

## 2019-09-24 DIAGNOSIS — Z00.00 WELLNESS EXAMINATION: ICD-10-CM

## 2019-09-24 DIAGNOSIS — Z01.00 ENCOUNTER FOR VISION SCREENING: ICD-10-CM

## 2019-09-24 PROCEDURE — 99396 PREV VISIT EST AGE 40-64: CPT | Mod: S$GLB,,, | Performed by: INTERNAL MEDICINE

## 2019-09-24 PROCEDURE — 99396 PR PREVENTIVE VISIT,EST,40-64: ICD-10-PCS | Mod: S$GLB,,, | Performed by: INTERNAL MEDICINE

## 2019-09-24 PROCEDURE — 99999 PR PBB SHADOW E&M-EST. PATIENT-LVL IV: CPT | Mod: PBBFAC,,, | Performed by: INTERNAL MEDICINE

## 2019-09-24 PROCEDURE — 99999 PR PBB SHADOW E&M-EST. PATIENT-LVL IV: ICD-10-PCS | Mod: PBBFAC,,, | Performed by: INTERNAL MEDICINE

## 2019-09-24 NOTE — PATIENT INSTRUCTIONS
New shingles vaccine: SHINGRIX ( 2018) not live, 90%,  2 shots, one at day zero and the 2nd at 2-6 months: any pharmacy can give it.      Outpatient Procedures Ordered This Visit     Ambulatory consult to Gynecology          Ambulatory consult to Optometry          Case request GI: COLONOSCOPY          Mammo Digital Screening Bilat w/ Jose

## 2019-09-24 NOTE — PROGRESS NOTES
Subjective:      Patient ID: Jayne Mejia is a 49 y.o. female.    Chief Complaint: Annual Exam    HPI:  HPI   Patient is here for her annual exam  She has a recent history for a concussion:4/18/2018 and will follow up with Dr. Fu. The last visit was 1/2019.    Consultants:  Dr. Delacruz: Neurology ( Venous hemangioma) MRI of the head and neck.    Annual exam: : 9/24/2019  Colonoscopy: ordered  Mammogram: ordered  Gyn:Dr. Silveira 9/26/2019 will be due  Optho due November 11/14/2019  Flu: today  Tetanus: 2014  Shingles: Shingrix  Pneumovax    Consult:  Dermatology: [facility] Psoriasis in her hair, Vitiligo    Lab: normal    Patient Active Problem List   Diagnosis    Other chest pain    Syncope and collapse    Chronic nonintractable headache    Dizziness due to old head injury    Postconcussive syndrome     Past Medical History:   Diagnosis Date    Bartholin cyst     History of abdominoplasty      Past Surgical History:   Procedure Laterality Date    Essure tubal occlusion      NOSE SURGERY      TUBAL LIGATION       Family History   Problem Relation Age of Onset    Breast cancer Paternal Grandmother     Cancer Paternal Grandmother     Colon cancer Paternal Grandmother     Heart attack Maternal Grandfather     Hypertension Maternal Grandmother     Diabetes Maternal Grandmother     Breast cancer Maternal Grandmother     Hashimoto's thyroiditis Sister     Ovarian cancer Neg Hx      Review of Systems   Constitutional: Negative for activity change and unexpected weight change.   HENT: Negative for hearing loss, rhinorrhea and trouble swallowing.    Eyes: Negative for discharge and visual disturbance.   Respiratory: Negative for chest tightness and wheezing.    Cardiovascular: Negative for chest pain and palpitations.   Gastrointestinal: Negative for blood in stool, constipation, diarrhea and vomiting.   Endocrine: Negative for polydipsia and polyuria.   Genitourinary: Negative for difficulty urinating,  "dysuria, hematuria and menstrual problem.   Musculoskeletal: Positive for neck pain. Negative for arthralgias and joint swelling.   Neurological: Positive for headaches. Negative for weakness.        Related to concussion   Psychiatric/Behavioral: Negative for confusion and dysphoric mood.     Objective:     Vitals:    09/24/19 0903   BP: 114/80   Weight: 66.4 kg (146 lb 6.2 oz)   Height: 5' 2.5" (1.588 m)   PainSc: 0-No pain     Body mass index is 26.35 kg/m².  Physical Exam   Constitutional: She is oriented to person, place, and time. She appears well-developed and well-nourished. No distress.   HENT:   Right Ear: Tympanic membrane and ear canal normal.   Left Ear: Tympanic membrane and ear canal normal.   Nose: No sinus tenderness or nasal deformity.   Mouth/Throat: No oropharyngeal exudate or posterior oropharyngeal erythema.   Eyes: Pupils are equal, round, and reactive to light. Conjunctivae, EOM and lids are normal.   Neck: Carotid bruit is not present. No thyromegaly present.   Cardiovascular: Normal rate, regular rhythm and intact distal pulses.   Pulmonary/Chest: Effort normal and breath sounds normal. No respiratory distress.   Abdominal: Soft. Bowel sounds are normal. There is no tenderness.   Musculoskeletal: She exhibits no edema or tenderness.   Lymphadenopathy:        Head (right side): No submandibular adenopathy present.        Head (left side): No submandibular adenopathy present.     She has no cervical adenopathy.     She has no axillary adenopathy.        Right: No inguinal adenopathy present.        Left: No inguinal adenopathy present.   Neurological: She is alert and oriented to person, place, and time. She has normal strength.   Skin: Skin is intact. No lesion noted.   Psychiatric: She has a normal mood and affect. Her speech is normal and behavior is normal.     Assessment:     1. Physical exam    2. Encounter for screening mammogram for breast cancer    3. Colon cancer screening    4. " Encounter for annual routine gynecological examination    5. Encounter for vision screening    6. Wellness examination      Plan:   Jayne was seen today for annual exam.    Diagnoses and all orders for this visit:    Physical exam  Comments:  Results related to concussion    Encounter for screening mammogram for breast cancer  Comments:  due  Orders:  -     Mammo Digital Screening Bilat w/ Jose; Future    Colon cancer screening  Comments:  due  Orders:  -     Case request GI: COLONOSCOPY    Encounter for annual routine gynecological examination  Comments:  due  Orders:  -     Ambulatory consult to Gynecology    Encounter for vision screening  Comments:  due  Orders:  -     Ambulatory consult to Optometry    Wellness examination  Comments:  Lab normal, flu due        Problem List Items Addressed This Visit     None      Visit Diagnoses     Physical exam    -  Primary    Results related to concussion    Encounter for screening mammogram for breast cancer        due    Relevant Orders    Mammo Digital Screening Bilat w/ Jose    Colon cancer screening        due    Relevant Orders    Case request GI: COLONOSCOPY (Completed)    Encounter for annual routine gynecological examination        due    Relevant Orders    Ambulatory consult to Gynecology    Encounter for vision screening        due    Relevant Orders    Ambulatory consult to Optometry    Wellness examination        Lab normal, flu due        Orders Placed This Encounter   Procedures    Mammo Digital Screening Bilat w/ Jose     Standing Status:   Future     Standing Expiration Date:   11/23/2020     Order Specific Question:   May the Radiologist modify the order per protocol to meet the clinical needs of the patient?     Answer:   Yes    Ambulatory consult to Gynecology     Referral Priority:   Routine     Referral Type:   Consultation     Referral Reason:   Specialty Services Required     Requested Specialty:   Gynecology     Number of Visits Requested:   1     Ambulatory consult to Optometry     Referral Priority:   Routine     Referral Type:   Vision (Optometry)     Referral Reason:   Specialty Services Required     Requested Specialty:   Optometry     Number of Visits Requested:   1    Case request GI: COLONOSCOPY     Order Specific Question:   Pre-op Diagnosis     Answer:   Screening [953010]     Order Specific Question:   CPT Code:     Answer:   FL COLORECTAL CANCER SCREEN RESULTS DOCUMENT/REVIEW [3017F]     Order Specific Question:   Case Referring Provider     Answer:   MAKEDA ORTA [[id number]]     Order Specific Question:   CPT Code:     Answer:   FL COLON CA SCRN NOT HI RSK IND []     Order Specific Question:   Medical Necessity:     Answer:   Medically Non-Urgent [100]     No follow-ups on file.     Medication List           Accurate as of September 24, 2019  9:44 AM. If you have any questions, ask your nurse or doctor.               CONTINUE taking these medications    fish oil-omega-3 fatty acids 300-1,000 mg capsule     fluocinonide 0.05 % external solution  Commonly known as:  LIDEX  Apply topically 2 (two) times daily.     loteprednol 0.5 % ophthalmic suspension  Commonly known as:  LOTEMAX  Place 1 drop into both eyes 2 (two) times daily as needed (to control inflammation).     ONE DAILY MULTIVITAMIN per tablet  Generic drug:  multivitamin     triamcinolone acetonide 0.1% 0.1 % cream  Commonly known as:  KENALOG  Apply topically 2 (two) times daily. arms     vitamin D 1000 units Tab  Commonly known as:  VITAMIN D3

## 2019-09-25 ENCOUNTER — IMMUNIZATION (OUTPATIENT)
Dept: PHARMACY | Facility: CLINIC | Age: 49
End: 2019-09-25
Payer: COMMERCIAL

## 2019-09-26 ENCOUNTER — HOSPITAL ENCOUNTER (OUTPATIENT)
Dept: RADIOLOGY | Facility: HOSPITAL | Age: 49
Discharge: HOME | End: 2019-09-26
Attending: INTERNAL MEDICINE
Payer: COMMERCIAL

## 2019-09-26 VITALS — HEIGHT: 63 IN | WEIGHT: 146.38 LBS | BODY MASS INDEX: 25.94 KG/M2

## 2019-09-26 DIAGNOSIS — Z12.31 ENCOUNTER FOR SCREENING MAMMOGRAM FOR BREAST CANCER: ICD-10-CM

## 2019-09-26 PROCEDURE — 77067 MAMMO DIGITAL SCREENING BILAT WITH TOMOSYNTHESIS_CAD: ICD-10-PCS | Mod: 26,,, | Performed by: RADIOLOGY

## 2019-09-26 PROCEDURE — 77067 SCR MAMMO BI INCL CAD: CPT | Mod: 26,,, | Performed by: RADIOLOGY

## 2019-09-26 PROCEDURE — 77063 MAMMO DIGITAL SCREENING BILAT WITH TOMOSYNTHESIS_CAD: ICD-10-PCS | Mod: 26,,, | Performed by: RADIOLOGY

## 2019-09-26 PROCEDURE — 77063 BREAST TOMOSYNTHESIS BI: CPT | Mod: 26,,, | Performed by: RADIOLOGY

## 2019-09-26 PROCEDURE — 77067 SCR MAMMO BI INCL CAD: CPT | Mod: TC,PN

## 2019-09-29 ENCOUNTER — PATIENT OUTREACH (OUTPATIENT)
Dept: ADMINISTRATIVE | Facility: OTHER | Age: 49
End: 2019-09-29

## 2019-09-29 NOTE — PROGRESS NOTES
Jayne Mejia is a 49 y.o. female  who presents for annual exam.  She is postmenopausal with her last period 3/2018.  She describes some hot flashes for which she is taking Estroven with improvement.  At this time, she declines HRT.  Recent mammogram was negative.  LMP 3/2018    Mammogram 19: Negative    Past Medical History:   Diagnosis Date    Bartholin cyst     History of abdominoplasty        Past Surgical History:   Procedure Laterality Date    Essure tubal occlusion      NOSE SURGERY      TUBAL LIGATION         OB History        1    Para   1    Term   1            AB        Living           SAB        TAB        Ectopic        Multiple        Live Births                     ROS:  GENERAL: Feeling well overall.   SKIN: Denies rash or lesions.   HEAD: Denies head injury or headache.   NODES: Denies enlarged lymph nodes.   CHEST: Denies chest pain or shortness of breath.   CARDIOVASCULAR: Denies palpitations or left sided chest pain.   ABDOMEN: No abdominal pain, nausea, vomiting or rectal bleeding.   URINARY: No dysuria or hematuria.  REPRODUCTIVE: See HPI.   BREASTS: Denies pain, lumps, or nipple discharge.   HEMATOLOGIC: No easy bruisability or excessive bleeding.   MUSCULOSKELETAL: Denies joint pain or swelling.   NEUROLOGIC: Denies syncope or weakness.   PSYCHIATRIC: Denies depression.    PE:   (chaperone present during entire exam)  APPEARANCE: Well nourished, well developed, in no acute distress.  BREASTS: Symmetrical, no skin changes or visible lesions. No palpable masses, nipple discharge or adenopathy bilaterally.  ABDOMEN: Soft. No tenderness or masses. No hernias. No CVA tenderness.  VULVA: No lesions. Normal female genitalia.  URETHRAL MEATUS: Normal size and location, no lesions, no prolapse.  URETHRA: No masses, tenderness, prolapse or scarring.  VAGINA: No lesions, no abnormal discharge, no significant cystocele or rectocele.  CERVIX: No lesions and discharge. PAP  done.  UTERUS: Normal size, regular shape, mobile, non-tender, bladder base nontender.  ADNEXA: No masses, tenderness or CDS nodularity.  ANUS PERINEUM: Normal.      Diagnosis:  1. Well woman exam with routine gynecological exam    2. Postmenopausal status    3. Pap smear for cervical cancer screening    4. History of nonchemical tubal occlusion          PLAN:    Orders Placed This Encounter    HPV High Risk Genotypes, PCR    Liquid-based pap smear, screening       Patient was counseled today on postmenopausal issues.  We discussed menopausal symptoms and treatment options.  At this time, she feels that she is doing well on Estroven and declines HRT.    Follow-up in 1 year.

## 2019-09-30 ENCOUNTER — OFFICE VISIT (OUTPATIENT)
Dept: OBSTETRICS AND GYNECOLOGY | Facility: CLINIC | Age: 49
End: 2019-09-30
Attending: OBSTETRICS & GYNECOLOGY
Payer: COMMERCIAL

## 2019-09-30 VITALS
SYSTOLIC BLOOD PRESSURE: 120 MMHG | WEIGHT: 147.69 LBS | HEIGHT: 62 IN | DIASTOLIC BLOOD PRESSURE: 71 MMHG | BODY MASS INDEX: 27.18 KG/M2

## 2019-09-30 DIAGNOSIS — Z78.0 POSTMENOPAUSAL STATUS: ICD-10-CM

## 2019-09-30 DIAGNOSIS — Z98.890 HISTORY OF NONCHEMICAL TUBAL OCCLUSION: ICD-10-CM

## 2019-09-30 DIAGNOSIS — Z01.419 WELL WOMAN EXAM WITH ROUTINE GYNECOLOGICAL EXAM: Primary | ICD-10-CM

## 2019-09-30 DIAGNOSIS — Z12.4 PAP SMEAR FOR CERVICAL CANCER SCREENING: ICD-10-CM

## 2019-09-30 PROCEDURE — 99396 PREV VISIT EST AGE 40-64: CPT | Mod: S$GLB,,, | Performed by: OBSTETRICS & GYNECOLOGY

## 2019-09-30 PROCEDURE — 99999 PR PBB SHADOW E&M-EST. PATIENT-LVL III: CPT | Mod: PBBFAC,,, | Performed by: OBSTETRICS & GYNECOLOGY

## 2019-09-30 PROCEDURE — 87624 HPV HI-RISK TYP POOLED RSLT: CPT

## 2019-09-30 PROCEDURE — 99396 PR PREVENTIVE VISIT,EST,40-64: ICD-10-PCS | Mod: S$GLB,,, | Performed by: OBSTETRICS & GYNECOLOGY

## 2019-09-30 PROCEDURE — 88175 CYTOPATH C/V AUTO FLUID REDO: CPT

## 2019-09-30 PROCEDURE — 99999 PR PBB SHADOW E&M-EST. PATIENT-LVL III: ICD-10-PCS | Mod: PBBFAC,,, | Performed by: OBSTETRICS & GYNECOLOGY

## 2019-09-30 RX ORDER — BIMATOPROST 3 UG/ML
SOLUTION TOPICAL
Refills: 5 | COMMUNITY
Start: 2019-07-16

## 2019-09-30 NOTE — LETTER
September 30, 2019      Amanda Willoughby MD  [address]  [facility] [address]           [facility] - [facility]stetrics [facility] Gynecology  [address] [place] [address]  [place] [address]  Phone: [phone]  Fax: [phone]          Patient: Jayne Mejia   MR Number: [id number]   YOB: 1970   Date of Visit: 9/30/2019       Dear Dr. Amanda Willoughby:    Thank you for referring Jayne Mejia to me for evaluation. Attached you will find relevant portions of my assessment and plan of care.    If you have questions, please do not hesitate to call me. I look forward to following Jayne Mejia along with you.    Sincerely,    Darin Silveira MD    Enclosure  CC:  No Recipients    If you would like to receive this communication electronically, please contact [email or web address] or [phone] to request more information on [employer] Link access.    For providers and/or their staff who would like to refer a patient to [facility], please contact us through our one-stop-shop provider referral line, [facility], at [phone].    If you feel you have received this communication in error or would no longer like to receive these types of communications, please e-mail [email or web address]

## 2019-10-02 LAB
HPV HR 12 DNA CVX QL NAA+PROBE: NEGATIVE
HPV16 AG SPEC QL: NEGATIVE
HPV18 DNA SPEC QL NAA+PROBE: NEGATIVE

## 2019-10-07 ENCOUNTER — PATIENT MESSAGE (OUTPATIENT)
Dept: OBSTETRICS AND GYNECOLOGY | Facility: CLINIC | Age: 49
End: 2019-10-07

## 2019-11-01 ENCOUNTER — TELEPHONE (OUTPATIENT)
Dept: OPTOMETRY | Facility: CLINIC | Age: 49
End: 2019-11-01

## 2019-12-03 ENCOUNTER — OFFICE VISIT (OUTPATIENT)
Dept: OPTOMETRY | Facility: CLINIC | Age: 49
End: 2019-12-03
Payer: COMMERCIAL

## 2019-12-03 DIAGNOSIS — Z13.5 GLAUCOMA SCREENING: ICD-10-CM

## 2019-12-03 DIAGNOSIS — Z01.00 EXAMINATION OF EYES AND VISION: Primary | ICD-10-CM

## 2019-12-03 DIAGNOSIS — H43.393 VITREOUS FLOATERS, BILATERAL: ICD-10-CM

## 2019-12-03 DIAGNOSIS — H52.203 MYOPIA WITH ASTIGMATISM AND PRESBYOPIA, BILATERAL: ICD-10-CM

## 2019-12-03 DIAGNOSIS — H52.13 MYOPIA WITH ASTIGMATISM AND PRESBYOPIA, BILATERAL: ICD-10-CM

## 2019-12-03 DIAGNOSIS — H52.4 MYOPIA WITH ASTIGMATISM AND PRESBYOPIA, BILATERAL: ICD-10-CM

## 2019-12-03 PROCEDURE — 92015 PR REFRACTION: ICD-10-PCS | Mod: S$GLB,,, | Performed by: OPTOMETRIST

## 2019-12-03 PROCEDURE — 92014 COMPRE OPH EXAM EST PT 1/>: CPT | Mod: S$GLB,,, | Performed by: OPTOMETRIST

## 2019-12-03 PROCEDURE — 92015 DETERMINE REFRACTIVE STATE: CPT | Mod: S$GLB,,, | Performed by: OPTOMETRIST

## 2019-12-03 PROCEDURE — 92014 PR EYE EXAM, EST PATIENT,COMPREHESV: ICD-10-PCS | Mod: S$GLB,,, | Performed by: OPTOMETRIST

## 2019-12-03 PROCEDURE — 99999 PR PBB SHADOW E&M-EST. PATIENT-LVL III: ICD-10-PCS | Mod: PBBFAC,,, | Performed by: OPTOMETRIST

## 2019-12-03 PROCEDURE — 99999 PR PBB SHADOW E&M-EST. PATIENT-LVL III: CPT | Mod: PBBFAC,,, | Performed by: OPTOMETRIST

## 2019-12-03 NOTE — PROGRESS NOTES
HPI     Routine eye exam-dle-11/14/18    Pt denies any changes since last visit. No blurred vision. No eye pain.   Possibly interested in contacts-has worn them before-has dry eyes.       Last edited by Sanam Marie on 12/3/2019  3:40 PM. (History)        ROS     Positive for: Eyes    Negative for: Constitutional, Gastrointestinal, Neurological, Skin,   Genitourinary, Musculoskeletal, HENT, Endocrine, Cardiovascular,   Respiratory, Psychiatric, Allergic/Imm, Heme/Lymph    Last edited by ISHMAEL Rubio, OD on 12/3/2019  3:48 PM. (History)        Assessment /Plan     For exam results, see Encounter Report.    Examination of eyes and vision    Vitreous floaters, bilateral    Glaucoma screening    Myopia with astigmatism and presbyopia, bilateral      1. Ocular health exam OU  2. Mild OU, RD precautions given  3. Not suspect  4. Updated specs rx, gave copy    H/o dry eye and discomfort with cls wear  Gave trials of daily disposable lenses to try as distance OU and / or monovision with OD corrected only  Discussed vision limitations with both, cautions driving w/ monovision  Call/ message with report, then consider f/u in office and release clrx    Discussed and educated patient on current findings /plan.  RTC 1 year, prn if any changes / issues

## 2020-02-05 ENCOUNTER — TELEPHONE (OUTPATIENT)
Dept: GASTROENTEROLOGY | Facility: CLINIC | Age: 50
End: 2020-02-05

## 2020-02-19 ENCOUNTER — TELEPHONE (OUTPATIENT)
Dept: GASTROENTEROLOGY | Facility: CLINIC | Age: 50
End: 2020-02-19

## 2020-02-27 ENCOUNTER — TELEPHONE (OUTPATIENT)
Dept: GASTROENTEROLOGY | Facility: CLINIC | Age: 50
End: 2020-02-27

## 2020-05-13 ENCOUNTER — PATIENT MESSAGE (OUTPATIENT)
Dept: OBSTETRICS AND GYNECOLOGY | Facility: CLINIC | Age: 50
End: 2020-05-13

## 2020-05-13 DIAGNOSIS — N64.4 BREAST PAIN, RIGHT: Primary | ICD-10-CM

## 2020-05-14 ENCOUNTER — TELEPHONE (OUTPATIENT)
Dept: GASTROENTEROLOGY | Facility: CLINIC | Age: 50
End: 2020-05-14

## 2020-05-14 ENCOUNTER — TELEPHONE (OUTPATIENT)
Dept: OBSTETRICS AND GYNECOLOGY | Facility: CLINIC | Age: 50
End: 2020-05-14

## 2020-05-14 ENCOUNTER — PATIENT MESSAGE (OUTPATIENT)
Dept: OBSTETRICS AND GYNECOLOGY | Facility: CLINIC | Age: 50
End: 2020-05-14

## 2020-05-14 DIAGNOSIS — Z71.89 ADVICE GIVEN ABOUT 2019 NOVEL CORONAVIRUS BY TELEPHONE: Primary | ICD-10-CM

## 2020-05-14 NOTE — TELEPHONE ENCOUNTER
Spoke with pt. Scheduled c-scope & covid. Prep instructions sent to pt's mychart. Pt verbalized understanding to all.

## 2020-05-14 NOTE — TELEPHONE ENCOUNTER
Contacted pt to get her scheduled for mmg, pt did not answer and could not LVM due to full mailbox.

## 2020-05-14 NOTE — TELEPHONE ENCOUNTER
Please let her know that I would recommend an exam and diagnostic mammogram / breast ultrasound on the right.   Mammogram 9/2019 was negative.    Orders have been entered.

## 2020-05-14 NOTE — TELEPHONE ENCOUNTER
Patient was notified of recommendations and scheduled for breast us/mammo on 5/18,follow up appt with Dr. Silveira on 5/[address] location.

## 2020-05-14 NOTE — TELEPHONE ENCOUNTER
----- Message from Erica Mejia sent at 5/14/2020  3:36 PM CDT -----  Type: Needs Medical Advice  Who Called:  patient  Best Call Back Number: [phone]  Additional Information: please give call back to schedule colonoscopy screening

## 2020-05-14 NOTE — TELEPHONE ENCOUNTER
----- Message from Erica Mejia sent at 5/14/2020  3:36 PM CDT -----  Type: Needs Medical Advice  Who Called:  patient  Best Call Back Number: [phone]  Additional Information: please give call back to schedule colonoscopy screening

## 2020-05-18 ENCOUNTER — PATIENT MESSAGE (OUTPATIENT)
Dept: OBSTETRICS AND GYNECOLOGY | Facility: CLINIC | Age: 50
End: 2020-05-18

## 2020-05-18 ENCOUNTER — HOSPITAL ENCOUNTER (OUTPATIENT)
Dept: RADIOLOGY | Facility: HOSPITAL | Age: 50
Discharge: HOME | End: 2020-05-18
Attending: OBSTETRICS & GYNECOLOGY
Payer: COMMERCIAL

## 2020-05-18 DIAGNOSIS — N64.4 BREAST PAIN, RIGHT: ICD-10-CM

## 2020-05-18 PROCEDURE — 77065 DX MAMMO INCL CAD UNI: CPT | Mod: TC,PO

## 2020-05-18 PROCEDURE — 77065 MAMMO DIGITAL DIAGNOSTIC RIGHT WITH TOMOSYNTHESIS_CAD: ICD-10-PCS | Mod: 26,,, | Performed by: RADIOLOGY

## 2020-05-18 PROCEDURE — 77061 BREAST TOMOSYNTHESIS UNI: CPT | Mod: 26,,, | Performed by: RADIOLOGY

## 2020-05-18 PROCEDURE — 77061 BREAST TOMOSYNTHESIS UNI: CPT | Mod: TC,PO

## 2020-05-18 PROCEDURE — 77065 DX MAMMO INCL CAD UNI: CPT | Mod: 26,,, | Performed by: RADIOLOGY

## 2020-05-18 PROCEDURE — 77061 MAMMO DIGITAL DIAGNOSTIC RIGHT WITH TOMOSYNTHESIS_CAD: ICD-10-PCS | Mod: 26,,, | Performed by: RADIOLOGY

## 2020-06-24 ENCOUNTER — LAB VISIT (OUTPATIENT)
Dept: FAMILY MEDICINE | Facility: CLINIC | Age: 50
End: 2020-06-24
Payer: COMMERCIAL

## 2020-06-24 DIAGNOSIS — Z71.89 ADVICE GIVEN ABOUT 2019 NOVEL CORONAVIRUS BY TELEPHONE: ICD-10-CM

## 2020-06-24 PROCEDURE — U0003 INFECTIOUS AGENT DETECTION BY NUCLEIC ACID (DNA OR RNA); SEVERE ACUTE RESPIRATORY SYNDROME CORONAVIRUS 2 (SARS-COV-2) (CORONAVIRUS DISEASE [COVID-19]), AMPLIFIED PROBE TECHNIQUE, MAKING USE OF HIGH THROUGHPUT TECHNOLOGIES AS DESCRIBED BY CMS-2020-01-R: HCPCS

## 2020-06-25 LAB — SARS-COV-2 RNA RESP QL NAA+PROBE: NOT DETECTED

## 2020-06-26 ENCOUNTER — ANESTHESIA EVENT (OUTPATIENT)
Dept: ENDOSCOPY | Facility: HOSPITAL | Age: 50
End: 2020-06-26
Payer: COMMERCIAL

## 2020-06-26 ENCOUNTER — ANESTHESIA (OUTPATIENT)
Dept: ENDOSCOPY | Facility: HOSPITAL | Age: 50
End: 2020-06-26
Payer: COMMERCIAL

## 2020-06-26 ENCOUNTER — HOSPITAL ENCOUNTER (OUTPATIENT)
Facility: HOSPITAL | Age: 50
Discharge: HOME | End: 2020-06-26
Attending: INTERNAL MEDICINE | Admitting: INTERNAL MEDICINE
Payer: COMMERCIAL

## 2020-06-26 VITALS
SYSTOLIC BLOOD PRESSURE: 111 MMHG | HEIGHT: 63 IN | RESPIRATION RATE: 12 BRPM | WEIGHT: 155 LBS | OXYGEN SATURATION: 99 % | TEMPERATURE: 97 F | HEART RATE: 70 BPM | DIASTOLIC BLOOD PRESSURE: 56 MMHG | BODY MASS INDEX: 27.46 KG/M2

## 2020-06-26 DIAGNOSIS — Z12.11 SCREEN FOR COLON CANCER: ICD-10-CM

## 2020-06-26 PROCEDURE — 88305 TISSUE EXAM BY PATHOLOGIST: CPT | Performed by: PATHOLOGY

## 2020-06-26 PROCEDURE — 88305 TISSUE EXAM BY PATHOLOGIST: CPT | Mod: 26,,, | Performed by: PATHOLOGY

## 2020-06-26 PROCEDURE — D9220A PRA ANESTHESIA: Mod: 33,ANES,, | Performed by: ANESTHESIOLOGY

## 2020-06-26 PROCEDURE — D9220A PRA ANESTHESIA: ICD-10-PCS | Mod: 33,CRNA,, | Performed by: NURSE ANESTHETIST, CERTIFIED REGISTERED

## 2020-06-26 PROCEDURE — 63600175 PHARM REV CODE 636 W HCPCS: Mod: PO | Performed by: INTERNAL MEDICINE

## 2020-06-26 PROCEDURE — 63600175 PHARM REV CODE 636 W HCPCS: Mod: PO | Performed by: NURSE ANESTHETIST, CERTIFIED REGISTERED

## 2020-06-26 PROCEDURE — 45385 COLONOSCOPY W/LESION REMOVAL: CPT | Mod: PO | Performed by: INTERNAL MEDICINE

## 2020-06-26 PROCEDURE — 45385 COLONOSCOPY W/LESION REMOVAL: CPT | Mod: 33,,, | Performed by: INTERNAL MEDICINE

## 2020-06-26 PROCEDURE — 27201089 HC SNARE, DISP (ANY): Mod: PO | Performed by: INTERNAL MEDICINE

## 2020-06-26 PROCEDURE — 45385 PR COLONOSCOPY,REMV LESN,SNARE: ICD-10-PCS | Mod: 33,,, | Performed by: INTERNAL MEDICINE

## 2020-06-26 PROCEDURE — D9220A PRA ANESTHESIA: Mod: 33,CRNA,, | Performed by: NURSE ANESTHETIST, CERTIFIED REGISTERED

## 2020-06-26 PROCEDURE — D9220A PRA ANESTHESIA: ICD-10-PCS | Mod: 33,ANES,, | Performed by: ANESTHESIOLOGY

## 2020-06-26 PROCEDURE — 88305 TISSUE EXAM BY PATHOLOGIST: ICD-10-PCS | Mod: 26,,, | Performed by: PATHOLOGY

## 2020-06-26 PROCEDURE — 37000008 HC ANESTHESIA 1ST 15 MINUTES: Mod: PO | Performed by: INTERNAL MEDICINE

## 2020-06-26 PROCEDURE — 37000009 HC ANESTHESIA EA ADD 15 MINS: Mod: PO | Performed by: INTERNAL MEDICINE

## 2020-06-26 RX ORDER — SECUKINUMAB 150 MG/ML
300 INJECTION SUBCUTANEOUS WEEKLY
COMMUNITY
End: 2023-10-25 | Stop reason: SDUPTHER

## 2020-06-26 RX ORDER — PROPOFOL 10 MG/ML
VIAL (ML) INTRAVENOUS
Status: DISCONTINUED | OUTPATIENT
Start: 2020-06-26 | End: 2020-06-26

## 2020-06-26 RX ORDER — LIDOCAINE HYDROCHLORIDE 20 MG/ML
INJECTION INTRAVENOUS
Status: DISCONTINUED | OUTPATIENT
Start: 2020-06-26 | End: 2020-06-26

## 2020-06-26 RX ORDER — SODIUM CHLORIDE, SODIUM LACTATE, POTASSIUM CHLORIDE, CALCIUM CHLORIDE 600; 310; 30; 20 MG/100ML; MG/100ML; MG/100ML; MG/100ML
INJECTION, SOLUTION INTRAVENOUS CONTINUOUS
Status: DISCONTINUED | OUTPATIENT
Start: 2020-06-26 | End: 2020-06-26 | Stop reason: HOSPADM

## 2020-06-26 RX ORDER — PROPOFOL 10 MG/ML
VIAL (ML) INTRAVENOUS CONTINUOUS PRN
Status: DISCONTINUED | OUTPATIENT
Start: 2020-06-26 | End: 2020-06-26

## 2020-06-26 RX ORDER — SODIUM CHLORIDE 0.9 % (FLUSH) 0.9 %
10 SYRINGE (ML) INJECTION
Status: DISCONTINUED | OUTPATIENT
Start: 2020-06-26 | End: 2020-06-26 | Stop reason: HOSPADM

## 2020-06-26 RX ADMIN — LIDOCAINE HYDROCHLORIDE 100 MG: 20 INJECTION, SOLUTION INTRAVENOUS at 10:06

## 2020-06-26 RX ADMIN — PROPOFOL 125 MG: 10 INJECTION, EMULSION INTRAVENOUS at 10:06

## 2020-06-26 RX ADMIN — SODIUM CHLORIDE, SODIUM LACTATE, POTASSIUM CHLORIDE, AND CALCIUM CHLORIDE: .6; .31; .03; .02 INJECTION, SOLUTION INTRAVENOUS at 09:06

## 2020-06-26 RX ADMIN — PROPOFOL 150 MCG/KG/MIN: 10 INJECTION, EMULSION INTRAVENOUS at 10:06

## 2020-06-26 NOTE — [employer] PATIENT INSTRUCTIONS
Discharge Summary/Instructions after an Endoscopic Procedure  Patient Name: Jayne Mejia  Patient MRN: [id number]  Patient YOB: 1970 Friday, June 26, 2020  Albin Cantu MD  RESTRICTIONS:  During your procedure today, you received medications for sedation.  These   medications may affect your judgment, balance and coordination.  Therefore,   for 24 hours, you have the following restrictions:   - DO NOT drive a car, operate machinery, make legal/financial decisions,   sign important papers or drink alcohol.    ACTIVITY:  Today: no heavy lifting, straining or running due to procedural   sedation/anesthesia.  The following day: return to full activity including work.  DIET:  Eat and drink normally unless instructed otherwise.     TREATMENT FOR COMMON SIDE EFFECTS:  - Mild abdominal pain, nausea, belching, bloating or excessive gas:  rest,   eat lightly and use a heating pad.  - Sore Throat: treat with throat lozenges and/or gargle with warm salt   water.  - Because air was used during the procedure, expelling large amounts of air   from your rectum or belching is normal.  - If a bowel prep was taken, you may not have a bowel movement for 1-3 days.    This is normal.  SYMPTOMS TO WATCH FOR AND REPORT TO YOUR PHYSICIAN:  1. Abdominal pain or bloating, other than gas cramps.  2. Chest pain.  3. Back pain.  4. Signs of infection such as: chills or fever occurring within 24 hours   after the procedure.  5. Rectal bleeding, which would show as bright red, maroon, or black stools.   (A tablespoon of blood from the rectum is not serious, especially if   hemorrhoids are present.)  6. Vomiting.  7. Weakness or dizziness.  GO DIRECTLY TO THE NEAREST EMERGENCY ROOM IF YOU HAVE ANY OF THE FOLLOWING:      Difficulty breathing              Chills and/or fever over 101 F   Persistent vomiting and/or vomiting blood   Severe abdominal pain   Severe chest pain   Black, tarry stools   Bleeding- more than one  tablespoon   Any other symptom or condition that you feel may need urgent attention  Your doctor recommends these additional instructions:  If any biopsies were taken, your doctors clinic will contact you in 1 to 2   weeks with any results.  We are waiting for your pathology results.   Your physician has recommended a repeat colonoscopy in five years for   surveillance based on pathology results.   You are being discharged to home.  For questions, problems or results please call your physician - Albin Cantu MD at Work:  [phone].  EMERGENCY PHONE NUMBER: [phone], LAB RESULTS: [phone]  IF A COMPLICATION OR EMERGENCY SITUATION ARISES AND YOU ARE UNABLE TO REACH   YOUR PHYSICIAN - GO DIRECTLY TO THE EMERGENCY ROOM.  ___________________________________________  Nurse Signature  ___________________________________________  Patient/Designated Responsible Party Signature  Albin Cantu MD  6/26/2020 10:55:44 AM  This report has been verified and signed electronically.  [employer]

## 2020-06-26 NOTE — ANESTHESIA POSTPROCEDURE EVALUATION
Anesthesia Post Evaluation    Patient: Jayne Mejia    Procedure(s) Performed: Procedure(s) (LRB):  COLONOSCOPY (N/A)    Final Anesthesia Type: general    Patient location during evaluation: PACU  Patient participation: Yes- Able to Participate  Level of consciousness: awake and alert, oriented and awake  Post-procedure vital signs: reviewed and stable  Pain management: adequate  Airway patency: patent    PONV status at discharge: No PONV  Anesthetic complications: no      Cardiovascular status: blood pressure returned to baseline and hemodynamically stable  Respiratory status: unassisted, spontaneous ventilation and room air  Hydration status: euvolemic  Follow-up not needed.          Vitals Value Taken Time   /56 06/26/20 1108   Temp 36.1 °C (97 °F) 06/26/20 1057   Pulse 70 06/26/20 1108   Resp 12 06/26/20 1108   SpO2 99 % 06/26/20 1108         Event Time   Out of Recovery 11:20:00         Pain/Zeb Score: Zeb Score: 10 (6/26/2020 11:17 AM)

## 2020-06-26 NOTE — TRANSFER OF CARE
"Anesthesia Transfer of Care Note    Patient: Jayne Mejia    Procedure(s) Performed: Procedure(s) (LRB):  COLONOSCOPY (N/A)    Anesthesia Type: general    Transport from OR: Transported from OR on room air with adequate spontaneous ventilation    Post pain: adequate analgesia    Post assessment: no apparent anesthetic complications and tolerated procedure well    Post vital signs: stable    Level of consciousness: responds to stimulation    Nausea/Vomiting: no nausea/vomiting    Complications: none    Transfer of care protocol was followed      Last vitals:   Visit Vitals  /69 (BP Location: Right arm, Patient Position: Sitting)   Pulse 81   Temp 36.4 °C (97.5 °F) (Skin)   Resp 17   Ht 5' 2.5" (1.588 m)   Wt 70.3 kg (155 lb)   SpO2 98%   Breastfeeding No   BMI 27.90 kg/m²     "

## 2020-06-26 NOTE — ANESTHESIA PREPROCEDURE EVALUATION
06/26/2020  Jayne Mejia is a 50 y.o., female.    Anesthesia Evaluation    I have reviewed the Patient Summary Reports.    I have reviewed the Nursing Notes.       Review of Systems  Anesthesia Hx:  No problems with previous Anesthesia    Neurological:   Headaches    Psych:   Psychiatric History          Physical Exam  General:  Well nourished    Airway/Jaw/Neck:  Airway Findings: Mouth Opening: Normal Tongue: Normal  General Airway Assessment: Adult  Mallampati: I  TM Distance: Normal, at least 6 cm  Jaw/Neck Findings:  Neck ROM: Normal ROM     Eyes/Ears/Nose:  Eyes/Ears/Nose Findings:    Dental:  Dental Findings:   Chest/Lungs:  Chest/Lungs Findings: Normal Respiratory Rate     Heart/Vascular:  Heart Findings: Rate: Normal  Rhythm: Regular Rhythm        Mental Status:  Mental Status Findings:  Cooperative, Alert and Oriented         Anesthesia Plan  Type of Anesthesia, risks & benefits discussed:  Anesthesia Type:  general  Patient's Preference: General  Intra-op Monitoring Plan: standard ASA monitors  Intra-op Monitoring Plan Comments:   Post Op Pain Control Plan:   Post Op Pain Control Plan Comments:   Induction:   IV  Beta Blocker:  Patient is not currently on a Beta-Blocker (No further documentation required).       Informed Consent: Patient understands risks and agrees with Anesthesia plan.  Questions answered. Anesthesia consent signed with patient.  ASA Score: 2     Day of Surgery Review of History & Physical:    H&P update referred to the surgeon.         Ready For Surgery From Anesthesia Perspective.

## 2020-06-26 NOTE — DISCHARGE SUMMARY
Discharge Note  Short Stay      SUMMARY     Admit Date: 6/26/2020    Attending Physician: Albin Cantu MD     Discharge Physician: Albin Cantu MD    Discharge Date: 6/26/2020 10:56 AM    Final Diagnosis: Colon cancer screening [Z12.11]    Disposition: HOME OR SELF CARE    Patient Instructions:   Current Discharge Medication List      CONTINUE these medications which have NOT CHANGED    Details   bimatoprost (LATISSE) 0.03 % ophthalmic solution USE AS DIRECTED  Refills: 5      fish oil-omega-3 fatty acids 300-1,000 mg capsule Take 2 g by mouth once daily.      fluocinonide (LIDEX) 0.05 % external solution Apply topically 2 (two) times daily.  Qty: 60 mL, Refills: 1    Associated Diagnoses: Psoriasis vulgaris      multivitamin (ONE DAILY MULTIVITAMIN) per tablet Take 1 tablet by mouth once daily.      secukinumab (COSENTYX) 150 mg/mL Syrg Inject 300 mg into the skin once a week.      triamcinolone acetonide 0.1% (KENALOG) 0.1 % cream Apply topically 2 (two) times daily. arms  Qty: 45 g, Refills: 1    Associated Diagnoses: Vitiligo      vitamin D 1000 units Tab Take 185 mg by mouth once daily.      flu vacc [id number] 6mos up,PF, 60 mcg (15 mcg x 4)/0.5 mL Syrg Inject 0.5 mLs into the muscle once. For one dose. for 1 dose  Qty: 0.5 mL, Refills: 0             Discharge Procedure Orders (must include Diet, Follow-up, Activity)    Follow Up:  Follow up with PCP as previously scheduled  Resume routine diet.  Activity as tolerated.    No driving day of procedure.

## 2020-06-26 NOTE — H&P
History & Physical - Short Stay  Gastroenterology      SUBJECTIVE:     Procedure: Colonoscopy    Chief Complaint/Indication for Procedure: Screening    PTA Medications   Medication Sig    bimatoprost (LATISSE) 0.03 % ophthalmic solution USE AS DIRECTED    fish oil-omega-3 fatty acids 300-1,000 mg capsule Take 2 g by mouth once daily.    fluocinonide (LIDEX) 0.05 % external solution Apply topically 2 (two) times daily.    multivitamin (ONE DAILY MULTIVITAMIN) per tablet Take 1 tablet by mouth once daily.    secukinumab (COSENTYX) 150 mg/mL Syrg Inject 300 mg into the skin once a week.    triamcinolone acetonide 0.1% (KENALOG) 0.1 % cream Apply topically 2 (two) times daily. arms    vitamin D 1000 units Tab Take 185 mg by mouth once daily.    flu vacc [id number] 6mos up,PF, 60 mcg (15 mcg x 4)/0.5 mL Syrg Inject 0.5 mLs into the muscle once. For one dose. for 1 dose       Review of patient's allergies indicates:   Allergen Reactions    Bactrim [sulfamethoxazole-trimethoprim] Other (See Comments)     Top lip swelling        Past Medical History:   Diagnosis Date    Bartholin cyst     History of abdominoplasty     Psoriasis      Past Surgical History:   Procedure Laterality Date    Essure tubal occlusion      NOSE SURGERY      TUBAL LIGATION       Family History   Problem Relation Age of Onset    Breast cancer Paternal Grandmother 91    Cancer Paternal Grandmother     Colon cancer Paternal Grandmother     Heart attack Maternal Grandfather     Hypertension Maternal Grandmother     Diabetes Maternal Grandmother     Breast cancer Maternal Grandmother 72    Hashimoto's thyroiditis Sister     Ovarian cancer Neg Hx      Social History     Tobacco Use    Smoking status: Never Smoker    Smokeless tobacco: Never Used   Substance Use Topics    Alcohol use: No     Frequency: Monthly or less     Drinks per session: 1 or 2     Binge frequency: Never    Drug use: No         OBJECTIVE:     Vital Signs (Most  Recent)  Temp: 97.5 °F (36.4 °C) (06/26/20 0943)  Pulse: 81 (06/26/20 0943)  Resp: 17 (06/26/20 0943)  BP: 125/69 (06/26/20 0943)  SpO2: 98 % (06/26/20 0943)    Physical Exam                                                        GENERAL:  Comfortable, in no acute distress.                                 HEENT EXAM:  Nonicteric.  No adenopathy.  Oropharynx is clear.               NECK:  Supple.                                                               LUNGS:  Clear.                                                               CARDIAC:  Regular rate and rhythm.  S1, S2.  No murmur.                      ABDOMEN:  Soft, positive bowel sounds, nontender.  No hepatosplenomegaly or masses.  No rebound or guarding.                                             EXTREMITIES:  No edema.     MENTAL STATUS:  Normal, alert and oriented.      ASSESSMENT/PLAN:     Assessment: Colorectal cancer screening    Plan: Colonoscopy    Anesthesia Plan: General    ASA Grade: ASA 2 - Patient with mild systemic disease with no functional limitations    MALLAMPATI SCORE:  I (soft palate, uvula, fauces, and tonsillar pillars visible)     In my medical opinion and judgment, this medical or surgical procedure was not able to be safely postponed in accordance with [place] Department of Health, Healthcare Facility Notice #2020-COVID19-ALL-007.

## 2020-07-01 LAB
FINAL PATHOLOGIC DIAGNOSIS: NORMAL
GROSS: NORMAL

## 2020-07-24 ENCOUNTER — OFFICE VISIT (OUTPATIENT)
Dept: OPTOMETRY | Facility: CLINIC | Age: 50
End: 2020-07-24
Payer: COMMERCIAL

## 2020-07-24 DIAGNOSIS — H10.503 BLEPHAROCONJUNCTIVITIS OF BOTH EYES, UNSPECIFIED BLEPHAROCONJUNCTIVITIS TYPE: ICD-10-CM

## 2020-07-24 DIAGNOSIS — H11.441 CONJUNCTIVAL CYST OF RIGHT EYE: Primary | ICD-10-CM

## 2020-07-24 PROCEDURE — 99999 PR PBB SHADOW E&M-EST. PATIENT-LVL III: ICD-10-PCS | Mod: PBBFAC,,, | Performed by: OPTOMETRIST

## 2020-07-24 PROCEDURE — 99999 PR PBB SHADOW E&M-EST. PATIENT-LVL III: CPT | Mod: PBBFAC,,, | Performed by: OPTOMETRIST

## 2020-07-24 PROCEDURE — 92012 INTRM OPH EXAM EST PATIENT: CPT | Mod: S$GLB,,, | Performed by: OPTOMETRIST

## 2020-07-24 PROCEDURE — 92012 PR EYE EXAM, EST PATIENT,INTERMED: ICD-10-PCS | Mod: S$GLB,,, | Performed by: OPTOMETRIST

## 2020-07-24 RX ORDER — LOTEPREDNOL ETABONATE 5 MG/ML
1 SUSPENSION/ DROPS OPHTHALMIC 2 TIMES DAILY PRN
Qty: 5 ML | Refills: 1 | Status: SHIPPED | OUTPATIENT
Start: 2020-07-24 | End: 2021-07-24

## 2020-07-24 NOTE — PATIENT INSTRUCTIONS
"DRY EYES -- BURNING OR REMY SYMPTOMS:  Use Over The Counter artificial tears as needed for dry eye symptoms.   Some common brands include:  Systane, Optive, Refresh, and Thera-Tears.  These drops can be used as frequently as desired, but may be most helpful use during long periods of concentrated work.  For example, reading / working at the computer. Start with 3-4x per day.     Nighttime Ophthalmic gel or ointments are available: Refresh PM, Genteal, and Lacrilube.    Avoid drops that "get redness out" (Visine, Murine, Clear Eyes), as these may contain medication that could further irritate the eyes, especially with chronic use.    ALLERGY EYES -- ITCHING SYMPTOMS:  Over the counter medications include--Pataday, Zaditor, and Alaway.  Use as directed 1-2 drops daily for symptoms of itching / watering eyes.  These drops will not help for dry eye or exposure symptoms.    REDNESS RELIEF:  Lumify---is a good redness reliever that will not cause irritation if used chronically.           "

## 2020-07-24 NOTE — PROGRESS NOTES
HPI     Urgent care-small bump on eye    Pt complains of a small white bump on eye x 1 week. Complains of slight   irritation/fbs. Looks like blister?    Agree above  Using lotemax bid prn for inflammation  Notes small raised blister near nasal canthus OD  No pain, no discharge   Vision normal      Last edited by ISHMAEL Rubio, OD on 7/24/2020  9:44 AM. (History)        ROS     Positive for: Eyes    Negative for: Constitutional, Gastrointestinal, Neurological, Skin,   Genitourinary, Musculoskeletal, HENT, Endocrine, Cardiovascular,   Respiratory, Psychiatric, Allergic/Imm, Heme/Lymph    Last edited by ISHMAEL Rubio, OD on 7/24/2020  9:27 AM. (History)        Assessment /Plan     For exam results, see Encounter Report.    Conjunctival cyst of right eye    Blepharoconjunctivitis of both eyes, unspecified blepharoconjunctivitis type  -     loteprednol (LOTEMAX) 0.5 % ophthalmic suspension; Place 1 drop into both eyes 2 (two) times daily as needed (to control inflammation).  Dispense: 5 mL; Refill: 1      1. Small conj cyst nasal OD  Warm compress, gentle massage  Monitor for changes  Discussed can perry if bigger, discomfort or interfere with lid closure  Knows to message if issues    2. Chronic issues, continue lid hygiene  ATs for comfort  Prn use of lotemax    Discussed and educated patient on current findings /plan.  RTC prn if any changes / issues

## 2020-09-17 DIAGNOSIS — M25.561 RIGHT KNEE PAIN, UNSPECIFIED CHRONICITY: Primary | ICD-10-CM

## 2020-09-18 ENCOUNTER — OFFICE VISIT (OUTPATIENT)
Dept: ORTHOPEDICS | Facility: CLINIC | Age: 50
End: 2020-09-18
Payer: COMMERCIAL

## 2020-09-18 ENCOUNTER — HOSPITAL ENCOUNTER (OUTPATIENT)
Dept: RADIOLOGY | Facility: HOSPITAL | Age: 50
Discharge: HOME | End: 2020-09-18
Attending: ORTHOPAEDIC SURGERY
Payer: COMMERCIAL

## 2020-09-18 VITALS — WEIGHT: 155 LBS | HEIGHT: 63 IN | BODY MASS INDEX: 27.46 KG/M2

## 2020-09-18 DIAGNOSIS — M25.561 RIGHT KNEE PAIN, UNSPECIFIED CHRONICITY: Primary | ICD-10-CM

## 2020-09-18 DIAGNOSIS — M25.561 RIGHT KNEE PAIN, UNSPECIFIED CHRONICITY: ICD-10-CM

## 2020-09-18 DIAGNOSIS — M25.552 LEFT HIP PAIN: ICD-10-CM

## 2020-09-18 PROCEDURE — 73502 X-RAY EXAM HIP UNI 2-3 VIEWS: CPT | Mod: TC,PO,LT

## 2020-09-18 PROCEDURE — 97760 ORTHOTIC MGMT&TRAING 1ST ENC: CPT | Mod: GP,S$GLB,, | Performed by: ORTHOPAEDIC SURGERY

## 2020-09-18 PROCEDURE — 73562 X-RAY EXAM OF KNEE 3: CPT | Mod: TC,PO,RT

## 2020-09-18 PROCEDURE — 99243 PR OFFICE CONSULTATION,LEVEL III: ICD-10-PCS | Mod: 25,S$GLB,, | Performed by: ORTHOPAEDIC SURGERY

## 2020-09-18 PROCEDURE — 73560 X-RAY EXAM OF KNEE 1 OR 2: CPT | Mod: TC,PO,LT

## 2020-09-18 PROCEDURE — 99999 PR PBB SHADOW E&M-EST. PATIENT-LVL III: ICD-10-PCS | Mod: PBBFAC,,, | Performed by: ORTHOPAEDIC SURGERY

## 2020-09-18 PROCEDURE — 97760 PR ORTHOTIC MGMT&TRAINJ INITIAL ENC EA 15 MINS: ICD-10-PCS | Mod: GP,S$GLB,, | Performed by: ORTHOPAEDIC SURGERY

## 2020-09-18 PROCEDURE — 73562 X-RAY EXAM OF KNEE 3: CPT | Mod: 26,RT,, | Performed by: RADIOLOGY

## 2020-09-18 PROCEDURE — 99243 OFF/OP CNSLTJ NEW/EST LOW 30: CPT | Mod: 25,S$GLB,, | Performed by: ORTHOPAEDIC SURGERY

## 2020-09-18 PROCEDURE — 73502 X-RAY EXAM HIP UNI 2-3 VIEWS: CPT | Mod: 26,LT,, | Performed by: RADIOLOGY

## 2020-09-18 PROCEDURE — 99999 PR PBB SHADOW E&M-EST. PATIENT-LVL III: CPT | Mod: PBBFAC,,, | Performed by: ORTHOPAEDIC SURGERY

## 2020-09-18 PROCEDURE — 73562 XR KNEE ORTHO RIGHT: ICD-10-PCS | Mod: 26,RT,, | Performed by: RADIOLOGY

## 2020-09-18 PROCEDURE — 73560 XR KNEE ORTHO RIGHT: ICD-10-PCS | Mod: 26,LT,, | Performed by: RADIOLOGY

## 2020-09-18 PROCEDURE — 73502 XR HIP 2 VIEW LEFT: ICD-10-PCS | Mod: 26,LT,, | Performed by: RADIOLOGY

## 2020-09-18 PROCEDURE — 73560 X-RAY EXAM OF KNEE 1 OR 2: CPT | Mod: 26,LT,, | Performed by: RADIOLOGY

## 2020-09-18 RX ORDER — MAGNESIUM 30 MG
TABLET ORAL ONCE
COMMUNITY
End: 2021-06-28

## 2020-09-18 NOTE — PROGRESS NOTES
50 years old complaining of pain in the right knee for couple weeks time that she has noticed after do an exercise class describes as sharp and points to the anterior aspect knee as location for pain.  Pain is made worse with kneeling ibuprofen seems to help also has a secondary complaint pain in left hip area which has been present for about 6 months time she rates the pain as a 10 on the pain scale mostly bothersome at nighttime she lies on that side    Exam shows nontender greater troch, straight leg raise testing weakly positive, somewhat tender low lumbar spine, hip range of motion is painless.  Knee exam is benign with no swelling or signs of infection or injury does have some tenderness throughout the knee      X-rays the knee are negative    Assessment:  Synovitis of the knee right knee, pain left hip area probable lumbar radicular symptoms    Plan:  Home exercise program for the back, knee brace, avoidance of aggravating activities for the knee and symptomatic care and long-term strengthening, follow-up as needed    Patient seen as a consult from Dr. Amanda Willoughby, communication via epic    We performed a custom orthotic/brace fitting, adjusting and training with the patient. The patient demonstrated understanding and proper care. This was performed for 15 minutes.    Further History  Aching pain  Worse with activity  Relieved with rest  No other associated symptoms  No other radiation    Further Exam  Alert and oriented  Pleasant  Contralateral limb has appropriate range of motion for age and condition  Contralateral limb has appropriate strength for age and condition  Contralateral limb has appropriate stability  for age and condition  No adenopathy  Pulses are appropriate for current condition  Skin is intact        Chief Complaint    Chief Complaint   Patient presents with    Right Knee - Pain    Left Hip - Pain       HPI  Jayne Mejia is a 50 y.o.  female who presents with       Past Medical  History  Past Medical History:   Diagnosis Date    Bartholin cyst     History of abdominoplasty     Psoriasis        Past Surgical History  Past Surgical History:   Procedure Laterality Date    COLONOSCOPY N/A 6/26/2020    Procedure: COLONOSCOPY;  Surgeon: Albin Cantu MD;  Location: [facility];  Service: Endoscopy;  Laterality: N/A;    Essure tubal occlusion      NOSE SURGERY      TUBAL LIGATION         Medications  Current Outpatient Medications   Medication Sig    bimatoprost (LATISSE) 0.03 % ophthalmic solution USE AS DIRECTED    flu vacc [id number] 6mos up,PF, 60 mcg (15 mcg x 4)/0.5 mL Syrg Inject 0.5 mLs into the muscle once. For one dose. for 1 dose    fluocinonide (LIDEX) 0.05 % external solution Apply topically 2 (two) times daily.    loteprednol (LOTEMAX) 0.5 % ophthalmic suspension Place 1 drop into both eyes 2 (two) times daily as needed (to control inflammation).    magnesium 30 mg Tab Take by mouth once.    multivitamin (ONE DAILY MULTIVITAMIN) per tablet Take 1 tablet by mouth once daily.    secukinumab (COSENTYX) 150 mg/mL Syrg Inject 300 mg into the skin once a week.    triamcinolone acetonide 0.1% (KENALOG) 0.1 % cream Apply topically 2 (two) times daily. arms    vitamin D 1000 units Tab Take 185 mg by mouth once daily.    fish oil-omega-3 fatty acids 300-1,000 mg capsule Take 2 g by mouth once daily.     No current facility-administered medications for this visit.        Allergies  Review of patient's allergies indicates:   Allergen Reactions    Bactrim [sulfamethoxazole-trimethoprim] Other (See Comments)     Top lip swelling       Family History  Family History   Problem Relation Age of Onset    Breast cancer Paternal Grandmother 91    Cancer Paternal Grandmother     Colon cancer Paternal Grandmother     Heart attack Maternal Grandfather     Hypertension Maternal Grandmother     Diabetes Maternal Grandmother     Breast cancer Maternal Grandmother 72    Hashimoto's  thyroiditis Sister     Ovarian cancer Neg Hx        Social History  Social History     Socioeconomic History    Marital status:      Spouse name: Not on file    Number of children: Not on file    Years of education: Not on file    Highest education level: Not on file   Occupational History    Not on file   Social Needs    Financial resource strain: Not on file    Food insecurity     Worry: Not on file     Inability: Not on file    Transportation needs     Medical: Not on file     Non-medical: Not on file   Tobacco Use    Smoking status: Never Smoker    Smokeless tobacco: Never Used   Substance and Sexual Activity    Alcohol use: No     Frequency: Monthly or less     Drinks per session: 1 or 2     Binge frequency: Never    Drug use: No    Sexual activity: Yes     Partners: Male   Lifestyle    Physical activity     Days per week: 4 days     Minutes per session: 60 min    Stress: Not at all   Relationships    Social connections     Talks on phone: More than three times a week     Gets together: More than three times a week     Attends Anabaptist service: Not on file     Active member of club or organization: Yes     Attends meetings of clubs or organizations: More than 4 times per year     Relationship status:    Other Topics Concern    Not on file   Social History Narrative    Not on file               Review of Systems     Constitutional: Negative    HENT: Negative  Eyes: Negative  Respiratory: Negative  Cardiovascular: Negative  Musculoskeletal: HPI  Skin: Negative  Neurological: Negative  Hematological: Negative  Endocrine: Negative                 Physical Exam    There were no vitals filed for this visit.  Body mass index is 27.9 kg/m².  Physical Examination:     General appearance -  well appearing, and in no distress  Mental status - awake  Neck - supple  Chest -  symmetric air entry  Heart - normal rate   Abdomen - soft      Assessment     1. Right knee pain, unspecified  chronicity          Plan

## 2020-10-03 ENCOUNTER — IMMUNIZATION (OUTPATIENT)
Dept: FAMILY MEDICINE | Facility: CLINIC | Age: 50
End: 2020-10-03
Payer: COMMERCIAL

## 2020-10-03 PROCEDURE — 90471 FLU VACCINE (QUAD) GREATER THAN OR EQUAL TO 3YO PRESERVATIVE FREE IM: ICD-10-PCS | Mod: S$GLB,,, | Performed by: INTERNAL MEDICINE

## 2020-10-03 PROCEDURE — 90471 IMMUNIZATION ADMIN: CPT | Mod: S$GLB,,, | Performed by: INTERNAL MEDICINE

## 2020-10-03 PROCEDURE — 90686 FLU VACCINE (QUAD) GREATER THAN OR EQUAL TO 3YO PRESERVATIVE FREE IM: ICD-10-PCS | Mod: S$GLB,,, | Performed by: INTERNAL MEDICINE

## 2020-10-03 PROCEDURE — 99999 PR PBB SHADOW E&M-EST. PATIENT-LVL I: CPT | Mod: PBBFAC,,,

## 2020-10-03 PROCEDURE — 90686 IIV4 VACC NO PRSV 0.5 ML IM: CPT | Mod: S$GLB,,, | Performed by: INTERNAL MEDICINE

## 2020-10-03 PROCEDURE — 99999 PR PBB SHADOW E&M-EST. PATIENT-LVL I: ICD-10-PCS | Mod: PBBFAC,,,

## 2020-10-05 ENCOUNTER — PATIENT MESSAGE (OUTPATIENT)
Dept: ADMINISTRATIVE | Facility: HOSPITAL | Age: 50
End: 2020-10-05

## 2020-11-11 ENCOUNTER — PATIENT MESSAGE (OUTPATIENT)
Dept: INTERNAL MEDICINE | Facility: CLINIC | Age: 50
End: 2020-11-11

## 2021-01-04 ENCOUNTER — PATIENT MESSAGE (OUTPATIENT)
Dept: ADMINISTRATIVE | Facility: HOSPITAL | Age: 51
End: 2021-01-04

## 2021-04-05 ENCOUNTER — PATIENT MESSAGE (OUTPATIENT)
Dept: ADMINISTRATIVE | Facility: HOSPITAL | Age: 51
End: 2021-04-05

## 2021-06-11 ENCOUNTER — TELEPHONE (OUTPATIENT)
Dept: OBSTETRICS AND GYNECOLOGY | Facility: CLINIC | Age: 51
End: 2021-06-11

## 2021-06-11 DIAGNOSIS — Z12.31 ENCOUNTER FOR SCREENING MAMMOGRAM FOR BREAST CANCER: Primary | ICD-10-CM

## 2021-06-19 ENCOUNTER — HOSPITAL ENCOUNTER (OUTPATIENT)
Dept: RADIOLOGY | Facility: HOSPITAL | Age: 51
Discharge: HOME | End: 2021-06-19
Attending: OBSTETRICS & GYNECOLOGY
Payer: COMMERCIAL

## 2021-06-19 DIAGNOSIS — Z12.31 ENCOUNTER FOR SCREENING MAMMOGRAM FOR BREAST CANCER: ICD-10-CM

## 2021-06-19 PROCEDURE — 77063 BREAST TOMOSYNTHESIS BI: CPT | Mod: 26,,, | Performed by: RADIOLOGY

## 2021-06-19 PROCEDURE — 77067 SCR MAMMO BI INCL CAD: CPT | Mod: 26,,, | Performed by: RADIOLOGY

## 2021-06-19 PROCEDURE — 77063 MAMMO DIGITAL SCREENING BILAT WITH TOMO: ICD-10-PCS | Mod: 26,,, | Performed by: RADIOLOGY

## 2021-06-19 PROCEDURE — 77067 SCR MAMMO BI INCL CAD: CPT | Mod: TC,PO

## 2021-06-19 PROCEDURE — 77067 MAMMO DIGITAL SCREENING BILAT WITH TOMO: ICD-10-PCS | Mod: 26,,, | Performed by: RADIOLOGY

## 2021-06-21 ENCOUNTER — PATIENT MESSAGE (OUTPATIENT)
Dept: OBSTETRICS AND GYNECOLOGY | Facility: CLINIC | Age: 51
End: 2021-06-21

## 2021-06-28 ENCOUNTER — OFFICE VISIT (OUTPATIENT)
Dept: INTERNAL MEDICINE | Facility: CLINIC | Age: 51
End: 2021-06-28
Payer: COMMERCIAL

## 2021-06-28 VITALS
HEIGHT: 63 IN | DIASTOLIC BLOOD PRESSURE: 82 MMHG | SYSTOLIC BLOOD PRESSURE: 120 MMHG | BODY MASS INDEX: 29.57 KG/M2 | HEART RATE: 84 BPM | WEIGHT: 166.88 LBS | OXYGEN SATURATION: 98 %

## 2021-06-28 DIAGNOSIS — D36.9 TUBULAR ADENOMA: ICD-10-CM

## 2021-06-28 DIAGNOSIS — Z00.00 WELLNESS EXAMINATION: Primary | ICD-10-CM

## 2021-06-28 DIAGNOSIS — R22.31 MASS OF HAND, RIGHT: ICD-10-CM

## 2021-06-28 DIAGNOSIS — L40.9 PSORIASIS: ICD-10-CM

## 2021-06-28 PROBLEM — Z12.11 SCREEN FOR COLON CANCER: Status: RESOLVED | Noted: 2020-06-26 | Resolved: 2021-06-28

## 2021-06-28 PROBLEM — R07.89 OTHER CHEST PAIN: Status: RESOLVED | Noted: 2018-05-22 | Resolved: 2021-06-28

## 2021-06-28 PROBLEM — R55 SYNCOPE AND COLLAPSE: Status: RESOLVED | Noted: 2018-05-22 | Resolved: 2021-06-28

## 2021-06-28 PROBLEM — S09.90XS DIZZINESS DUE TO OLD HEAD INJURY: Status: RESOLVED | Noted: 2018-06-20 | Resolved: 2021-06-28

## 2021-06-28 PROBLEM — G89.29 CHRONIC NONINTRACTABLE HEADACHE: Status: RESOLVED | Noted: 2018-05-22 | Resolved: 2021-06-28

## 2021-06-28 PROBLEM — F07.81 POSTCONCUSSIVE SYNDROME: Status: RESOLVED | Noted: 2018-06-20 | Resolved: 2021-06-28

## 2021-06-28 PROBLEM — R42 DIZZINESS DUE TO OLD HEAD INJURY: Status: RESOLVED | Noted: 2018-06-20 | Resolved: 2021-06-28

## 2021-06-28 PROBLEM — R51.9 CHRONIC NONINTRACTABLE HEADACHE: Status: RESOLVED | Noted: 2018-05-22 | Resolved: 2021-06-28

## 2021-06-28 PROCEDURE — 3008F BODY MASS INDEX DOCD: CPT | Mod: CPTII,S$GLB,, | Performed by: INTERNAL MEDICINE

## 2021-06-28 PROCEDURE — 99396 PR PREVENTIVE VISIT,EST,40-64: ICD-10-PCS | Mod: S$GLB,,, | Performed by: INTERNAL MEDICINE

## 2021-06-28 PROCEDURE — 3008F PR BODY MASS INDEX (BMI) DOCUMENTED: ICD-10-PCS | Mod: CPTII,S$GLB,, | Performed by: INTERNAL MEDICINE

## 2021-06-28 PROCEDURE — 99396 PREV VISIT EST AGE 40-64: CPT | Mod: S$GLB,,, | Performed by: INTERNAL MEDICINE

## 2021-06-28 PROCEDURE — 99999 PR PBB SHADOW E&M-EST. PATIENT-LVL IV: CPT | Mod: PBBFAC,,, | Performed by: INTERNAL MEDICINE

## 2021-06-28 PROCEDURE — 99999 PR PBB SHADOW E&M-EST. PATIENT-LVL IV: ICD-10-PCS | Mod: PBBFAC,,, | Performed by: INTERNAL MEDICINE

## 2021-06-28 PROCEDURE — 1126F PR PAIN SEVERITY QUANTIFIED, NO PAIN PRESENT: ICD-10-PCS | Mod: S$GLB,,, | Performed by: INTERNAL MEDICINE

## 2021-06-28 PROCEDURE — 1126F AMNT PAIN NOTED NONE PRSNT: CPT | Mod: S$GLB,,, | Performed by: INTERNAL MEDICINE

## 2021-07-19 ENCOUNTER — PATIENT MESSAGE (OUTPATIENT)
Dept: ORTHOPEDICS | Facility: CLINIC | Age: 51
End: 2021-07-19

## 2021-08-06 ENCOUNTER — PATIENT OUTREACH (OUTPATIENT)
Dept: ADMINISTRATIVE | Facility: OTHER | Age: 51
End: 2021-08-06

## 2021-08-09 ENCOUNTER — OFFICE VISIT (OUTPATIENT)
Dept: OBSTETRICS AND GYNECOLOGY | Facility: CLINIC | Age: 51
End: 2021-08-09
Attending: OBSTETRICS & GYNECOLOGY
Payer: COMMERCIAL

## 2021-08-09 VITALS
BODY MASS INDEX: 29.17 KG/M2 | HEIGHT: 62 IN | WEIGHT: 158.5 LBS | DIASTOLIC BLOOD PRESSURE: 80 MMHG | SYSTOLIC BLOOD PRESSURE: 100 MMHG

## 2021-08-09 DIAGNOSIS — Z98.890 HISTORY OF NONCHEMICAL TUBAL OCCLUSION: ICD-10-CM

## 2021-08-09 DIAGNOSIS — Z78.0 POSTMENOPAUSAL STATUS: ICD-10-CM

## 2021-08-09 DIAGNOSIS — Z01.419 WOMEN'S ANNUAL ROUTINE GYNECOLOGICAL EXAMINATION: Primary | ICD-10-CM

## 2021-08-09 DIAGNOSIS — Z12.4 PAP SMEAR FOR CERVICAL CANCER SCREENING: ICD-10-CM

## 2021-08-09 PROCEDURE — 1160F PR REVIEW ALL MEDS BY PRESCRIBER/CLIN PHARMACIST DOCUMENTED: ICD-10-PCS | Mod: CPTII,S$GLB,, | Performed by: OBSTETRICS & GYNECOLOGY

## 2021-08-09 PROCEDURE — 1159F MED LIST DOCD IN RCRD: CPT | Mod: CPTII,S$GLB,, | Performed by: OBSTETRICS & GYNECOLOGY

## 2021-08-09 PROCEDURE — 3074F PR MOST RECENT SYSTOLIC BLOOD PRESSURE < 130 MM HG: ICD-10-PCS | Mod: CPTII,S$GLB,, | Performed by: OBSTETRICS & GYNECOLOGY

## 2021-08-09 PROCEDURE — 3079F PR MOST RECENT DIASTOLIC BLOOD PRESSURE 80-89 MM HG: ICD-10-PCS | Mod: CPTII,S$GLB,, | Performed by: OBSTETRICS & GYNECOLOGY

## 2021-08-09 PROCEDURE — 99999 PR PBB SHADOW E&M-EST. PATIENT-LVL III: ICD-10-PCS | Mod: PBBFAC,,, | Performed by: OBSTETRICS & GYNECOLOGY

## 2021-08-09 PROCEDURE — 1126F AMNT PAIN NOTED NONE PRSNT: CPT | Mod: CPTII,S$GLB,, | Performed by: OBSTETRICS & GYNECOLOGY

## 2021-08-09 PROCEDURE — 3079F DIAST BP 80-89 MM HG: CPT | Mod: CPTII,S$GLB,, | Performed by: OBSTETRICS & GYNECOLOGY

## 2021-08-09 PROCEDURE — 3074F SYST BP LT 130 MM HG: CPT | Mod: CPTII,S$GLB,, | Performed by: OBSTETRICS & GYNECOLOGY

## 2021-08-09 PROCEDURE — 3008F PR BODY MASS INDEX (BMI) DOCUMENTED: ICD-10-PCS | Mod: CPTII,S$GLB,, | Performed by: OBSTETRICS & GYNECOLOGY

## 2021-08-09 PROCEDURE — 3008F BODY MASS INDEX DOCD: CPT | Mod: CPTII,S$GLB,, | Performed by: OBSTETRICS & GYNECOLOGY

## 2021-08-09 PROCEDURE — 1159F PR MEDICATION LIST DOCUMENTED IN MEDICAL RECORD: ICD-10-PCS | Mod: CPTII,S$GLB,, | Performed by: OBSTETRICS & GYNECOLOGY

## 2021-08-09 PROCEDURE — 88175 CYTOPATH C/V AUTO FLUID REDO: CPT | Performed by: OBSTETRICS & GYNECOLOGY

## 2021-08-09 PROCEDURE — 99999 PR PBB SHADOW E&M-EST. PATIENT-LVL III: CPT | Mod: PBBFAC,,, | Performed by: OBSTETRICS & GYNECOLOGY

## 2021-08-09 PROCEDURE — 99396 PREV VISIT EST AGE 40-64: CPT | Mod: S$GLB,,, | Performed by: OBSTETRICS & GYNECOLOGY

## 2021-08-09 PROCEDURE — 87624 HPV HI-RISK TYP POOLED RSLT: CPT | Performed by: OBSTETRICS & GYNECOLOGY

## 2021-08-09 PROCEDURE — 99396 PR PREVENTIVE VISIT,EST,40-64: ICD-10-PCS | Mod: S$GLB,,, | Performed by: OBSTETRICS & GYNECOLOGY

## 2021-08-09 PROCEDURE — 1160F RVW MEDS BY RX/DR IN RCRD: CPT | Mod: CPTII,S$GLB,, | Performed by: OBSTETRICS & GYNECOLOGY

## 2021-08-09 PROCEDURE — 1126F PR PAIN SEVERITY QUANTIFIED, NO PAIN PRESENT: ICD-10-PCS | Mod: CPTII,S$GLB,, | Performed by: OBSTETRICS & GYNECOLOGY

## 2021-08-09 RX ORDER — PRASTERONE 6.5 MG/1
6.5 INSERT VAGINAL NIGHTLY
Qty: 30 EACH | Refills: 6 | Status: SHIPPED | OUTPATIENT
Start: 2021-08-09

## 2021-08-13 ENCOUNTER — PATIENT MESSAGE (OUTPATIENT)
Dept: OBSTETRICS AND GYNECOLOGY | Facility: CLINIC | Age: 51
End: 2021-08-13

## 2021-08-13 LAB
FINAL PATHOLOGIC DIAGNOSIS: NORMAL
Lab: NORMAL

## 2021-08-17 LAB
HPV HR 12 DNA SPEC QL NAA+PROBE: NEGATIVE
HPV16 AG SPEC QL: NEGATIVE
HPV18 DNA SPEC QL NAA+PROBE: NEGATIVE

## 2021-11-29 ENCOUNTER — OFFICE VISIT (OUTPATIENT)
Dept: OPTOMETRY | Facility: CLINIC | Age: 51
End: 2021-11-29
Payer: COMMERCIAL

## 2021-11-29 DIAGNOSIS — H43.393 VITREOUS FLOATERS, BILATERAL: ICD-10-CM

## 2021-11-29 DIAGNOSIS — Z01.00 EXAMINATION OF EYES AND VISION: Primary | ICD-10-CM

## 2021-11-29 DIAGNOSIS — Z13.5 GLAUCOMA SCREENING: ICD-10-CM

## 2021-11-29 DIAGNOSIS — H52.13 MYOPIA WITH ASTIGMATISM AND PRESBYOPIA, BILATERAL: ICD-10-CM

## 2021-11-29 DIAGNOSIS — H52.4 MYOPIA WITH ASTIGMATISM AND PRESBYOPIA, BILATERAL: ICD-10-CM

## 2021-11-29 DIAGNOSIS — H52.203 MYOPIA WITH ASTIGMATISM AND PRESBYOPIA, BILATERAL: ICD-10-CM

## 2021-11-29 DIAGNOSIS — H10.503 BLEPHAROCONJUNCTIVITIS OF BOTH EYES, UNSPECIFIED BLEPHAROCONJUNCTIVITIS TYPE: ICD-10-CM

## 2021-11-29 PROCEDURE — 99999 PR PBB SHADOW E&M-EST. PATIENT-LVL III: CPT | Mod: PBBFAC,,, | Performed by: OPTOMETRIST

## 2021-11-29 PROCEDURE — 92014 PR EYE EXAM, EST PATIENT,COMPREHESV: ICD-10-PCS | Mod: S$GLB,,, | Performed by: OPTOMETRIST

## 2021-11-29 PROCEDURE — 99999 PR PBB SHADOW E&M-EST. PATIENT-LVL III: ICD-10-PCS | Mod: PBBFAC,,, | Performed by: OPTOMETRIST

## 2021-11-29 PROCEDURE — 92015 DETERMINE REFRACTIVE STATE: CPT | Mod: S$GLB,,, | Performed by: OPTOMETRIST

## 2021-11-29 PROCEDURE — 92015 PR REFRACTION: ICD-10-PCS | Mod: S$GLB,,, | Performed by: OPTOMETRIST

## 2021-11-29 PROCEDURE — 92014 COMPRE OPH EXAM EST PT 1/>: CPT | Mod: S$GLB,,, | Performed by: OPTOMETRIST

## 2021-11-29 RX ORDER — LOTEPREDNOL ETABONATE 5 MG/ML
1 SUSPENSION/ DROPS OPHTHALMIC 2 TIMES DAILY PRN
Qty: 5 ML | Refills: 1 | Status: SHIPPED | OUTPATIENT
Start: 2021-11-29 | End: 2022-11-29

## 2021-11-29 RX ORDER — LOTEPREDNOL ETABONATE 5 MG/ML
1 SUSPENSION/ DROPS OPHTHALMIC 4 TIMES DAILY
COMMUNITY
End: 2021-11-29 | Stop reason: SDUPTHER

## 2022-12-06 ENCOUNTER — TELEPHONE (OUTPATIENT)
Dept: OBSTETRICS AND GYNECOLOGY | Facility: CLINIC | Age: 52
End: 2022-12-06
Payer: COMMERCIAL

## 2022-12-06 DIAGNOSIS — Z12.31 ENCOUNTER FOR SCREENING MAMMOGRAM FOR BREAST CANCER: Primary | ICD-10-CM

## 2022-12-06 NOTE — TELEPHONE ENCOUNTER
Sima LOFTON Staff  Caller: Unspecified (Today, 10:38 AM)                Name of Who is Calling:  Jayne Mejia     Who Left The Message:  Jayne Mejia       What is the request in detail:        Patient called requesting to schedule her annual mammogram; please put the Orders in at your earliest convenience and further advise.    Thank you!       Reply by [facility]: YES

## 2022-12-06 NOTE — TELEPHONE ENCOUNTER
----- Message from Sima Mendoza sent at 12/6/2022 10:38 AM CST -----  Regarding: Orders                Name of Who is Calling:  Jayne Mejia    Who Left The Message:  Jayne Mejia      What is the request in detail:        Patient called requesting to schedule her annual mammogram; please put the Orders in at your earliest convenience and further advise.    Thank you!      Reply by [facility]: YES      Preferred Call Back  :  [phone] (m)

## 2023-01-05 ENCOUNTER — HOSPITAL ENCOUNTER (OUTPATIENT)
Dept: RADIOLOGY | Facility: HOSPITAL | Age: 53
Discharge: HOME | End: 2023-01-05
Attending: OBSTETRICS & GYNECOLOGY
Payer: COMMERCIAL

## 2023-01-05 DIAGNOSIS — Z12.31 ENCOUNTER FOR SCREENING MAMMOGRAM FOR BREAST CANCER: ICD-10-CM

## 2023-01-05 PROCEDURE — 77067 SCR MAMMO BI INCL CAD: CPT | Mod: 26,,, | Performed by: RADIOLOGY

## 2023-01-05 PROCEDURE — 77063 BREAST TOMOSYNTHESIS BI: CPT | Mod: 26,,, | Performed by: RADIOLOGY

## 2023-01-05 PROCEDURE — 77067 SCR MAMMO BI INCL CAD: CPT | Mod: TC,PO

## 2023-01-05 PROCEDURE — 77063 MAMMO DIGITAL SCREENING BILAT WITH TOMO: ICD-10-PCS | Mod: 26,,, | Performed by: RADIOLOGY

## 2023-01-05 PROCEDURE — 77067 MAMMO DIGITAL SCREENING BILAT WITH TOMO: ICD-10-PCS | Mod: 26,,, | Performed by: RADIOLOGY

## 2023-01-06 ENCOUNTER — PATIENT MESSAGE (OUTPATIENT)
Dept: OBSTETRICS AND GYNECOLOGY | Facility: CLINIC | Age: 53
End: 2023-01-06
Payer: COMMERCIAL

## 2023-03-23 LAB
CHOLEST SERPL-MSCNC: 177 MG/DL (ref 0–200)
HBA1C MFR BLD: 5.3 % (ref 4–6)
HCV AB SERPL QL IA: NEGATIVE
HDLC SERPL-MCNC: 64 MG/DL (ref 35–70)
HIV 1 AB: NOT DETECTED
LDLC SERPL CALC-MCNC: 98 MG/DL (ref 0–160)
TRIGL SERPL-MCNC: 63 MG/DL (ref 40–160)

## 2023-03-30 ENCOUNTER — PATIENT OUTREACH (OUTPATIENT)
Dept: ADMINISTRATIVE | Facility: HOSPITAL | Age: 53
End: 2023-03-30
Payer: COMMERCIAL

## 2023-03-31 ENCOUNTER — OFFICE VISIT (OUTPATIENT)
Dept: FAMILY MEDICINE | Facility: CLINIC | Age: 53
End: 2023-03-31
Payer: COMMERCIAL

## 2023-03-31 VITALS
RESPIRATION RATE: 16 BRPM | HEIGHT: 62 IN | HEART RATE: 90 BPM | BODY MASS INDEX: 27.38 KG/M2 | DIASTOLIC BLOOD PRESSURE: 76 MMHG | SYSTOLIC BLOOD PRESSURE: 116 MMHG | OXYGEN SATURATION: 98 % | WEIGHT: 148.81 LBS

## 2023-03-31 DIAGNOSIS — Z78.0 MENOPAUSE: ICD-10-CM

## 2023-03-31 DIAGNOSIS — Z00.00 WELLNESS EXAMINATION: Primary | ICD-10-CM

## 2023-03-31 DIAGNOSIS — L40.9 PSORIASIS: ICD-10-CM

## 2023-03-31 DIAGNOSIS — Z13.820 ENCOUNTER FOR SCREENING FOR OSTEOPOROSIS: ICD-10-CM

## 2023-03-31 PROCEDURE — 3074F SYST BP LT 130 MM HG: CPT | Mod: CPTII,S$GLB,, | Performed by: INTERNAL MEDICINE

## 2023-03-31 PROCEDURE — 3008F PR BODY MASS INDEX (BMI) DOCUMENTED: ICD-10-PCS | Mod: CPTII,S$GLB,, | Performed by: INTERNAL MEDICINE

## 2023-03-31 PROCEDURE — 90732 PNEUMOCOCCAL POLYSACCHARIDE VACCINE 23-VALENT =>2YO SQ IM: ICD-10-PCS | Mod: S$GLB,,, | Performed by: INTERNAL MEDICINE

## 2023-03-31 PROCEDURE — 99396 PREV VISIT EST AGE 40-64: CPT | Mod: 25,S$GLB,, | Performed by: INTERNAL MEDICINE

## 2023-03-31 PROCEDURE — 3078F DIAST BP <80 MM HG: CPT | Mod: CPTII,S$GLB,, | Performed by: INTERNAL MEDICINE

## 2023-03-31 PROCEDURE — 3078F PR MOST RECENT DIASTOLIC BLOOD PRESSURE < 80 MM HG: ICD-10-PCS | Mod: CPTII,S$GLB,, | Performed by: INTERNAL MEDICINE

## 2023-03-31 PROCEDURE — 99396 PR PREVENTIVE VISIT,EST,40-64: ICD-10-PCS | Mod: 25,S$GLB,, | Performed by: INTERNAL MEDICINE

## 2023-03-31 PROCEDURE — 3044F HG A1C LEVEL LT 7.0%: CPT | Mod: CPTII,S$GLB,, | Performed by: INTERNAL MEDICINE

## 2023-03-31 PROCEDURE — 99999 PR PBB SHADOW E&M-EST. PATIENT-LVL IV: ICD-10-PCS | Mod: PBBFAC,,, | Performed by: INTERNAL MEDICINE

## 2023-03-31 PROCEDURE — 90732 PPSV23 VACC 2 YRS+ SUBQ/IM: CPT | Mod: S$GLB,,, | Performed by: INTERNAL MEDICINE

## 2023-03-31 PROCEDURE — 3074F PR MOST RECENT SYSTOLIC BLOOD PRESSURE < 130 MM HG: ICD-10-PCS | Mod: CPTII,S$GLB,, | Performed by: INTERNAL MEDICINE

## 2023-03-31 PROCEDURE — 1160F RVW MEDS BY RX/DR IN RCRD: CPT | Mod: CPTII,S$GLB,, | Performed by: INTERNAL MEDICINE

## 2023-03-31 PROCEDURE — 1159F MED LIST DOCD IN RCRD: CPT | Mod: CPTII,S$GLB,, | Performed by: INTERNAL MEDICINE

## 2023-03-31 PROCEDURE — 90471 PNEUMOCOCCAL POLYSACCHARIDE VACCINE 23-VALENT =>2YO SQ IM: ICD-10-PCS | Mod: S$GLB,,, | Performed by: INTERNAL MEDICINE

## 2023-03-31 PROCEDURE — 1160F PR REVIEW ALL MEDS BY PRESCRIBER/CLIN PHARMACIST DOCUMENTED: ICD-10-PCS | Mod: CPTII,S$GLB,, | Performed by: INTERNAL MEDICINE

## 2023-03-31 PROCEDURE — 3008F BODY MASS INDEX DOCD: CPT | Mod: CPTII,S$GLB,, | Performed by: INTERNAL MEDICINE

## 2023-03-31 PROCEDURE — 99999 PR PBB SHADOW E&M-EST. PATIENT-LVL IV: CPT | Mod: PBBFAC,,, | Performed by: INTERNAL MEDICINE

## 2023-03-31 PROCEDURE — 1159F PR MEDICATION LIST DOCUMENTED IN MEDICAL RECORD: ICD-10-PCS | Mod: CPTII,S$GLB,, | Performed by: INTERNAL MEDICINE

## 2023-03-31 PROCEDURE — 90471 IMMUNIZATION ADMIN: CPT | Mod: S$GLB,,, | Performed by: INTERNAL MEDICINE

## 2023-03-31 PROCEDURE — 3044F PR MOST RECENT HEMOGLOBIN A1C LEVEL <7.0%: ICD-10-PCS | Mod: CPTII,S$GLB,, | Performed by: INTERNAL MEDICINE

## 2023-03-31 NOTE — PROGRESS NOTES
Subjective:       Patient ID: Jayne Mejia is a 52 y.o. female.    Chief Complaint: Establish Care and Results (Labs in chart done on 2023)   HPI    Gyn:  menopause age 48 // Dr Silveira   MM2023  Dexa:  ordered   Colonoscopy:  2020   Tubular adenoma. R/c 5   Immunizations: Flu: Tdap:   Pneumovax:   Shingles: Y Covid: Y   Smoker:  Never  Derm: Dr [facility]     Here to establish care  Dr. 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 Wellness examination    2. Psoriasis    3. Menopause  - DXA Bone Density Axial Skeleton 1 or more sites; Future    4. Encounter for screening for osteoporosis  - DXA Bone Density Axial Skeleton 1 or more sites; Future     Wellness examination    Psoriasis    Menopause  -     DXA Bone Density Axial Skeleton 1 or more sites; Future; Expected date: 2023    Encounter for screening for osteoporosis  -     DXA Bone Density Axial Skeleton 1 or more sites; Future; Expected date: 2023    Other orders  -     (In Office Administered) Pneumococcal Polysaccharide Vaccine (23 Valent) (SQ/IM)        Continue to work on regular exercise, maintain healthy weight, balanced diet. Avoid unhealthy habits: smoking, excessive alcohol intake.     Disclaimer: This note was partly generated using dictation software which may occasionally result in transcription errors  ____________________________________________________________________________________________________  Review of Systems:  Review of Systems   Constitutional:  Negative for appetite change.   HENT:  Negative for nosebleeds.    Eyes:  Negative for pain.   Respiratory:  Negative for choking.    Cardiovascular:  Negative for chest pain.   Gastrointestinal:  Negative for blood in  stool.   Genitourinary:  Negative for hematuria.   Musculoskeletal:  Negative for joint swelling.   Skin:  Negative for pallor.   Neurological:  Negative for facial asymmetry.   Hematological:  Does not bruise/bleed easily.   Psychiatric/Behavioral:  Negative for confusion.      Objective:     Wt Readings from Last 3 Encounters:   03/31/23 67.5 kg (148 lb 13 oz)   08/09/21 71.9 kg (158 lb 8.2 oz)   06/28/21 75.7 kg (166 lb 14.2 oz)     BP Readings from Last 3 Encounters:   03/31/23 116/76   08/09/21 100/80   06/28/21 120/82       Lab Results   Component Value Date    WBC 4.94 05/26/2018    HGB 12.8 05/26/2018    HCT 39.0 05/26/2018     05/26/2018     05/26/2018    K 3.9 05/26/2018     05/26/2018    ALT 21 05/26/2018    AST 21 05/26/2018    CO2 25 05/26/2018    CREATININE 0.7 05/26/2018    BUN 14 05/26/2018    GLU 89 05/26/2018      Hemoglobin A1C   Date Value Ref Range Status   03/23/2023 5.3 4.0 - 6.0 % Final   05/26/2018 4.9 4.0 - 5.6 % Final     Comment:     According to ADA guidelines, hemoglobin A1c <7.0% represents  optimal control in non-pregnant diabetic patients. Different  metrics may apply to specific patient populations.   Standards of Medical Care in Diabetes-2016.  For the purpose of screening for the presence of diabetes:  <5.7%     Consistent with the absence of diabetes  5.7-6.4%  Consistent with increasing risk for diabetes   (prediabetes)  >or=6.5%  Consistent with diabetes  Currently, no consensus exists for use of hemoglobin A1c  for diagnosis of diabetes for children.  This Hemoglobin A1c assay has significant interference with fetal   hemoglobin   (HbF). The results are invalid for patients with abnormal amounts of   HbF,   including those with known Hereditary Persistence   of Fetal Hemoglobin. Heterozygous hemoglobin variants (HbAS, HbAC,   HbAD, HbAE, HbA2) do not significantly interfere with this assay;   however, presence of multiple variants in a sample may impact the  %   interference.        Lab Results   Component Value Date    TSH 1.884 05/26/2018    TSH 1.389 11/04/2015    TSH 1.323 04/16/2014     Lab Results   Component Value Date    FREET4 1.00 05/26/2018    FREET4 0.99 11/04/2015     Lab Results   Component Value Date    LDLCALC 98 03/23/2023    LDLCALC 108.8 05/26/2018    LDLCALC 119.6 11/04/2015     Lab Results   Component Value Date    TRIG 63 03/23/2023    TRIG 66 05/26/2018    TRIG 82 11/04/2015            Physical Exam  Constitutional:       Appearance: Normal appearance.   HENT:      Head: Normocephalic and atraumatic.   Eyes:      Extraocular Movements: Extraocular movements intact.      Pupils: Pupils are equal, round, and reactive to light.   Cardiovascular:      Rate and Rhythm: Normal rate.   Pulmonary:      Effort: Pulmonary effort is normal.   Neurological:      Mental Status: She is alert and oriented to person, place, and time.   Psychiatric:         Mood and Affect: Mood normal.       Medication List with Changes/Refills   Current Medications    BIMATOPROST (LATISSE) 0.03 % OPHTHALMIC SOLUTION    USE AS DIRECTED    MULTIVITAMIN (THERAGRAN) PER TABLET    Take 1 tablet by mouth once daily.    PRASTERONE, DHEA, (INTRAROSA) 6.5 MG INST    Place 6.5 mg vaginally every evening.    SECUKINUMAB (COSENTYX) 150 MG/ML SYRG    Inject 300 mg into the skin once a week.    VITAMIN D 1000 UNITS TAB    Take 185 mg by mouth once daily.

## 2023-04-09 NOTE — PROGRESS NOTES
"Jayne Mejia is a 53 y.o. year old  female who presents for routine GYN exam.  She is postmenopausal, not on hormones.  Because of vaginal dryness, she received an Rx for IntraRosa at he last annual exam.  She describe dyspareunia but has not been using IntraRosa regularly.  Recent mammogram was negative.  S/P Essure tubal occlusion.    Blood pressure 114/72, height 5' 2" (1.575 m), weight 68.1 kg (150 lb 2.1 oz).    23 Mammogram: Negative, TC15.28%    21 Pap: Negative, HPV: Negative      Past Medical History:   Diagnosis Date    Bartholin cyst     History of abdominoplasty     Psoriasis     scalp    Shingles        Past Surgical History:   Procedure Laterality Date    COLONOSCOPY N/A 2020    Procedure: COLONOSCOPY;  Surgeon: Albin Cantu MD;  Location: [facility];  Service: Endoscopy;  Laterality: N/A;    Essure tubal occlusion      NOSE SURGERY      TUBAL LIGATION         OB History          1    Para   1    Term   1            AB        Living             SAB        IAB        Ectopic        Multiple        Live Births                       ROS:  GENERAL: Feeling well overall.   SKIN: Denies rash or lesions.   HEAD: Denies head injury or headache.   NODES: Denies enlarged lymph nodes.   CHEST: Denies chest pain or shortness of breath.   CARDIOVASCULAR: Denies palpitations or left sided chest pain.   ABDOMEN: No abdominal pain, nausea, vomiting or rectal bleeding.   URINARY: No dysuria or hematuria.  REPRODUCTIVE: See HPI.   BREASTS: Denies pain, lumps, or nipple discharge.   HEMATOLOGIC: No easy bruisability or excessive bleeding.   MUSCULOSKELETAL: Denies joint pain.  NEUROLOGIC: Denies syncope or weakness.   PSYCHIATRIC: Denies depression.     PE:   (chaperone present during entire exam)  APPEARANCE: Well nourished, well developed, in no acute distress.  BREASTS: Symmetrical, no skin changes or visible lesions. No palpable masses, nipple discharge or adenopathy " bilaterally.  ABDOMEN: Soft. No tenderness or masses. No CVA tenderness.  VULVA: No lesions. Normal female genitalia.  URETHRAL MEATUS: Normal size and location, no lesions, no prolapse.  URETHRA: No masses, tenderness, prolapse or scarring.  VAGINA: No lesions, no abnormal discharge, no significant cystocele or rectocele.  CERVIX: No lesions and discharge. PAP done.  UTERUS: Normal size, regular shape, mobile, non-tender, bladder base nontender.  ADNEXA: No masses, tenderness or CDS nodularity.  ANUS PERINEUM: Normal.    Diagnosis:  1. Women's annual routine gynecological examination    2. Postmenopausal status    3. Pap smear for cervical cancer screening          PLAN:    Orders Placed This Encounter    HPV High Risk Genotypes, PCR    Liquid-Based Pap Smear, Screening       Patient was counseled today on postmenopausal issues.  We discussed her vaginal dryness with resulting dyspareunia and management options.  She plans to increase her usage of IntraRosa and will let us know if she does not find significant improvement.    Follow-up in 1 year.    This note was created with voice recognition software.  Grammatical, syntax and spelling errors may be inevitable.

## 2023-04-10 ENCOUNTER — OFFICE VISIT (OUTPATIENT)
Dept: OBSTETRICS AND GYNECOLOGY | Facility: CLINIC | Age: 53
End: 2023-04-10
Payer: COMMERCIAL

## 2023-04-10 VITALS
DIASTOLIC BLOOD PRESSURE: 72 MMHG | BODY MASS INDEX: 27.63 KG/M2 | HEIGHT: 62 IN | SYSTOLIC BLOOD PRESSURE: 114 MMHG | WEIGHT: 150.13 LBS

## 2023-04-10 DIAGNOSIS — Z12.4 PAP SMEAR FOR CERVICAL CANCER SCREENING: ICD-10-CM

## 2023-04-10 DIAGNOSIS — Z01.419 WOMEN'S ANNUAL ROUTINE GYNECOLOGICAL EXAMINATION: Primary | ICD-10-CM

## 2023-04-10 DIAGNOSIS — Z78.0 POSTMENOPAUSAL STATUS: ICD-10-CM

## 2023-04-10 PROCEDURE — 3008F BODY MASS INDEX DOCD: CPT | Mod: CPTII,S$GLB,, | Performed by: OBSTETRICS & GYNECOLOGY

## 2023-04-10 PROCEDURE — 3078F DIAST BP <80 MM HG: CPT | Mod: CPTII,S$GLB,, | Performed by: OBSTETRICS & GYNECOLOGY

## 2023-04-10 PROCEDURE — 1159F MED LIST DOCD IN RCRD: CPT | Mod: CPTII,S$GLB,, | Performed by: OBSTETRICS & GYNECOLOGY

## 2023-04-10 PROCEDURE — 3074F PR MOST RECENT SYSTOLIC BLOOD PRESSURE < 130 MM HG: ICD-10-PCS | Mod: CPTII,S$GLB,, | Performed by: OBSTETRICS & GYNECOLOGY

## 2023-04-10 PROCEDURE — 3074F SYST BP LT 130 MM HG: CPT | Mod: CPTII,S$GLB,, | Performed by: OBSTETRICS & GYNECOLOGY

## 2023-04-10 PROCEDURE — 88175 CYTOPATH C/V AUTO FLUID REDO: CPT | Performed by: OBSTETRICS & GYNECOLOGY

## 2023-04-10 PROCEDURE — 3044F HG A1C LEVEL LT 7.0%: CPT | Mod: CPTII,S$GLB,, | Performed by: OBSTETRICS & GYNECOLOGY

## 2023-04-10 PROCEDURE — 3044F PR MOST RECENT HEMOGLOBIN A1C LEVEL <7.0%: ICD-10-PCS | Mod: CPTII,S$GLB,, | Performed by: OBSTETRICS & GYNECOLOGY

## 2023-04-10 PROCEDURE — 3008F PR BODY MASS INDEX (BMI) DOCUMENTED: ICD-10-PCS | Mod: CPTII,S$GLB,, | Performed by: OBSTETRICS & GYNECOLOGY

## 2023-04-10 PROCEDURE — 3078F PR MOST RECENT DIASTOLIC BLOOD PRESSURE < 80 MM HG: ICD-10-PCS | Mod: CPTII,S$GLB,, | Performed by: OBSTETRICS & GYNECOLOGY

## 2023-04-10 PROCEDURE — 1159F PR MEDICATION LIST DOCUMENTED IN MEDICAL RECORD: ICD-10-PCS | Mod: CPTII,S$GLB,, | Performed by: OBSTETRICS & GYNECOLOGY

## 2023-04-10 PROCEDURE — 99999 PR PBB SHADOW E&M-EST. PATIENT-LVL III: ICD-10-PCS | Mod: PBBFAC,,, | Performed by: OBSTETRICS & GYNECOLOGY

## 2023-04-10 PROCEDURE — 1160F RVW MEDS BY RX/DR IN RCRD: CPT | Mod: CPTII,S$GLB,, | Performed by: OBSTETRICS & GYNECOLOGY

## 2023-04-10 PROCEDURE — 99396 PREV VISIT EST AGE 40-64: CPT | Mod: S$GLB,,, | Performed by: OBSTETRICS & GYNECOLOGY

## 2023-04-10 PROCEDURE — 99396 PR PREVENTIVE VISIT,EST,40-64: ICD-10-PCS | Mod: S$GLB,,, | Performed by: OBSTETRICS & GYNECOLOGY

## 2023-04-10 PROCEDURE — 99999 PR PBB SHADOW E&M-EST. PATIENT-LVL III: CPT | Mod: PBBFAC,,, | Performed by: OBSTETRICS & GYNECOLOGY

## 2023-04-10 PROCEDURE — 87624 HPV HI-RISK TYP POOLED RSLT: CPT | Performed by: OBSTETRICS & GYNECOLOGY

## 2023-04-10 PROCEDURE — 1160F PR REVIEW ALL MEDS BY PRESCRIBER/CLIN PHARMACIST DOCUMENTED: ICD-10-PCS | Mod: CPTII,S$GLB,, | Performed by: OBSTETRICS & GYNECOLOGY

## 2023-04-19 ENCOUNTER — PATIENT MESSAGE (OUTPATIENT)
Dept: OBSTETRICS AND GYNECOLOGY | Facility: CLINIC | Age: 53
End: 2023-04-19
Payer: COMMERCIAL

## 2023-04-19 LAB
FINAL PATHOLOGIC DIAGNOSIS: NORMAL
Lab: NORMAL

## 2024-01-24 DIAGNOSIS — Z12.31 OTHER SCREENING MAMMOGRAM: ICD-10-CM

## 2024-05-30 ENCOUNTER — TELEPHONE (OUTPATIENT)
Dept: FAMILY MEDICINE | Facility: CLINIC | Age: 54
End: 2024-05-30
Payer: COMMERCIAL

## 2024-05-30 NOTE — TELEPHONE ENCOUNTER
Spoke with patient and informed her she had an annual apt already booked, pt stated she was on the phone with another person and they were saying the appointment did not go through. I explained to her, from my end she did have an appointment to be seen already. I let her know that their was an apt available a few days before the apt that was already scheduled. We ended up switching her apt to the sooner appointment.

## 2024-05-30 NOTE — TELEPHONE ENCOUNTER
----- Message from Lionel Lopez sent at 5/30/2024  3:04 PM CDT -----  Consult/Advisory    Name Of Caller:Jayne       Contact Preference:[phone]    Nature of call: Ptn called to set a appt it will not allow me to set the appt please call to assist

## 2024-06-04 ENCOUNTER — HOSPITAL ENCOUNTER (OUTPATIENT)
Dept: RADIOLOGY | Facility: HOSPITAL | Age: 54
Discharge: HOME | End: 2024-06-04
Attending: INTERNAL MEDICINE
Payer: COMMERCIAL

## 2024-06-04 DIAGNOSIS — Z13.820 ENCOUNTER FOR SCREENING FOR OSTEOPOROSIS: ICD-10-CM

## 2024-06-04 DIAGNOSIS — Z78.0 MENOPAUSE: ICD-10-CM

## 2024-06-04 PROCEDURE — 77080 DXA BONE DENSITY AXIAL: CPT | Mod: 26,,, | Performed by: RADIOLOGY

## 2024-06-04 PROCEDURE — 77080 DXA BONE DENSITY AXIAL: CPT | Mod: TC,PO

## 2024-06-05 ENCOUNTER — HOSPITAL ENCOUNTER (OUTPATIENT)
Dept: RADIOLOGY | Facility: HOSPITAL | Age: 54
Discharge: HOME | End: 2024-06-05
Attending: INTERNAL MEDICINE
Payer: COMMERCIAL

## 2024-06-05 DIAGNOSIS — Z12.31 OTHER SCREENING MAMMOGRAM: ICD-10-CM

## 2024-06-05 PROCEDURE — 77067 SCR MAMMO BI INCL CAD: CPT | Mod: TC,PO

## 2024-06-05 PROCEDURE — 77063 BREAST TOMOSYNTHESIS BI: CPT | Mod: TC,PO

## 2024-06-05 PROCEDURE — 77067 SCR MAMMO BI INCL CAD: CPT | Mod: 26,,, | Performed by: RADIOLOGY

## 2024-06-05 PROCEDURE — 77063 BREAST TOMOSYNTHESIS BI: CPT | Mod: 26,,, | Performed by: RADIOLOGY

## 2024-06-06 ENCOUNTER — OFFICE VISIT (OUTPATIENT)
Dept: OPTOMETRY | Facility: CLINIC | Age: 54
End: 2024-06-06
Payer: COMMERCIAL

## 2024-06-06 DIAGNOSIS — H52.203 MYOPIA WITH ASTIGMATISM AND PRESBYOPIA, BILATERAL: ICD-10-CM

## 2024-06-06 DIAGNOSIS — H52.13 MYOPIA WITH ASTIGMATISM AND PRESBYOPIA, BILATERAL: ICD-10-CM

## 2024-06-06 DIAGNOSIS — H52.4 MYOPIA WITH ASTIGMATISM AND PRESBYOPIA, BILATERAL: ICD-10-CM

## 2024-06-06 DIAGNOSIS — Z01.00 EXAMINATION OF EYES AND VISION: Primary | ICD-10-CM

## 2024-06-06 DIAGNOSIS — H02.723 HYPOTRICHOSIS OF EYELID OF BOTH EYES: ICD-10-CM

## 2024-06-06 DIAGNOSIS — H10.503 BLEPHAROCONJUNCTIVITIS OF BOTH EYES, UNSPECIFIED BLEPHAROCONJUNCTIVITIS TYPE: ICD-10-CM

## 2024-06-06 DIAGNOSIS — H02.726 HYPOTRICHOSIS OF EYELID OF BOTH EYES: ICD-10-CM

## 2024-06-06 DIAGNOSIS — Z13.5 GLAUCOMA SCREENING: ICD-10-CM

## 2024-06-06 PROCEDURE — 92014 COMPRE OPH EXAM EST PT 1/>: CPT | Mod: S$GLB,,, | Performed by: OPTOMETRIST

## 2024-06-06 PROCEDURE — 1159F MED LIST DOCD IN RCRD: CPT | Mod: CPTII,S$GLB,, | Performed by: OPTOMETRIST

## 2024-06-06 PROCEDURE — 92015 DETERMINE REFRACTIVE STATE: CPT | Mod: S$GLB,,, | Performed by: OPTOMETRIST

## 2024-06-06 PROCEDURE — 99999 PR PBB SHADOW E&M-EST. PATIENT-LVL III: CPT | Mod: PBBFAC,,, | Performed by: OPTOMETRIST

## 2024-06-06 RX ORDER — LOTEPREDNOL ETABONATE 5 MG/ML
1 SUSPENSION/ DROPS OPHTHALMIC 2 TIMES DAILY PRN
Qty: 5 ML | Refills: 1 | Status: SHIPPED | OUTPATIENT
Start: 2024-06-06 | End: 2025-06-06

## 2024-06-06 NOTE — PATIENT INSTRUCTIONS
"DRY EYES -- BURNING OR REMY SYMPTOMS:  Use Over The Counter artificial tears as needed for dry eye symptoms.   Some common brands include:  Systane, Optive, Refresh, and Thera-Tears.  These drops can be used as frequently as desired, but may be most helpful use during long periods of concentrated work.  For example, reading / working at the computer. Start with 3-4x per day.     Nighttime Ophthalmic gel or ointments are available: Refresh PM, Genteal, and Lacrilube.    Avoid drops that "get redness out" (Visine, Murine, Clear Eyes), as these may contain medication that could further irritate the eyes, especially with chronic use.    ALLERGY EYES -- ITCHING SYMPTOMS:  Over the counter medications include--Pataday, Zaditor, and Alaway.  Use as directed 1-2 drops daily for symptoms of itching / watering eyes.  These drops will not help for dry eye or exposure symptoms.    REDNESS RELIEF:  Lumify---is a good redness reliever that will not cause irritation if used chronically.        FLASHES / FLOATERS / POSTERIOR VITREOUS DETACHMENT    Call the clinic if you have any further changes in symptoms.  Including:  Increased numbers of floaters or flashing lights, dimness or darkness that moves through or stays constant in your vision, or any pain in the eye (s).    You may sometimes see small specks or clouds moving in your field of vision.  They are called FLOATERS.  You can often see them when looking at a plain background, like a blank wall or blue quynh.  Floaters are actually tiny clumps of gel or cells inside the VITREOUS, the clear jelly-like fluid that fills the inside of your eye.    While these objects look like they are in front of your eye, they are actually floating inside.  What you see are the shadows they cast on the RETINA, the nerve layer at the back of the eye that senses light and allows you to see.      POSTERIOR VITREOUS DETACHMENT    The appearance of new floaters may be alarming.  If you suddenly " develop new floaters, you should contact your eye care professional  right away.    The retina can tear if the shrinking vitreous pulls away from the wall of the eye.  This sometimes causes a small amount of bleeding in the eye that may appear as new floaters.    A torn retina is always a serious problem, since it can lead to a retinal detachment.  You should see your eye care professional as soon as possible if:    even one new floater appears suddenly;  you see sudden flashes of light;  you notice other symptoms, like the loss of side vision, or a curtain closes down in your vision        POSTERIOR VITREOUS DETACHMENT is more common for people who:    are nearsighted;  have had cataract surgery;  have had YAG laser surgery of the eye;  have had inflammation inside the eye;  are over age 60.      While some floaters may remain visible, many of them will fade over time and become less noticeable.  Even if you've had some floaters for years, you should have your eyes checked as soon as possible if you notice new ones.    FLASHING LIGHTS    When the vitreous gel rubs or pulls on the retina, you may see what look like flashing lights or lightning streaks.  These flashes can appear off and on for several weeks or months.      Some people experience flashes of light that appear as jagged lines or heat waves in both eyes, lasting 10-20 minutes.  These flashes are caused by a spasm of blood vessels in the brain, which is called a migraine.    If a headache follows these flashes, it's called a migraine headache.  If   no headache occurs, these flashes are called Ophthalmic or Ocular Migraine.

## 2024-06-06 NOTE — PROGRESS NOTES
HPI    Annual HENRY 11/29/21    Pt complains of blurred near vision, pt wears distance specs but doesnot   wear often. Pt denies flashes but notices floaters. Pt not using gtts.   Last edited by Zehra Sahu on 6/6/2024  1:55 PM.            Assessment /Plan     For exam results, see Encounter Report.    Examination of eyes and vision    Myopia with astigmatism and presbyopia, bilateral    Glaucoma screening    Blepharoconjunctivitis of both eyes, unspecified blepharoconjunctivitis type  -     loteprednol (LOTEMAX) 0.5 % ophthalmic suspension; Place 1 drop into both eyes 2 (two) times daily as needed (for inflammation. Chronic use not to exceed 10 days).  Dispense: 5 mL; Refill: 1    Hypotrichosis of eyelid of both eyes      Ocular health exam OU   Updated specs for sep pairs distance / near ---discussed PAL vs svl   Not suspect   H/o mild inflammation and transient dry eye ---will refill lotemax for prn use   H/o lash loss ---longstanding use of latisse with good result     Discussed and discouraged cl's fitting   Dry eye complaint, low Rx distance, variable vision symptoms ---advised continue with specs     Discussed and educated patient on current findings /plan.  RTC 1 year, prn if any changes / issues                    - - -

## 2024-06-08 LAB
ALT SERPL-CCNC: NORMAL U/L
AST SERPL-CCNC: NORMAL U/L
BUN BLD-MCNC: NORMAL MG/DL
CHOLEST SERPL-MSCNC: 173 MG/DL (ref 0–200)
CREAT SERPL-MCNC: NORMAL MG/DL
GLUCOSE 1H P 100 G GLC PO SERPL-MCNC: NORMAL MG/DL
GLUCOSE 2H P 100 G GLC PO SERPL-MCNC: NORMAL MG/DL
HBA1C MFR BLD: 5.6 %
HDLC SERPL-MCNC: 67 MG/DL
HIV 1+2 AB+HIV1 P24 AG SERPL QL IA: NEGATIVE
LDLC SERPL CALC-MCNC: 90 MG/DL (ref 0–160)
MICROALBUMIN URINE: NORMAL
MICROALBUMIN/CREATININE RATIO: NORMAL
PROTEIN/CREATININE RATIO: NORMAL
TRIGL SERPL-MCNC: 75 MG/DL

## 2024-07-03 ENCOUNTER — OFFICE VISIT (OUTPATIENT)
Dept: OBSTETRICS AND GYNECOLOGY | Facility: CLINIC | Age: 54
End: 2024-07-03
Attending: OBSTETRICS & GYNECOLOGY
Payer: COMMERCIAL

## 2024-07-03 VITALS
WEIGHT: 140 LBS | BODY MASS INDEX: 25.76 KG/M2 | HEIGHT: 62 IN | DIASTOLIC BLOOD PRESSURE: 72 MMHG | SYSTOLIC BLOOD PRESSURE: 110 MMHG

## 2024-07-03 DIAGNOSIS — Z78.0 POSTMENOPAUSAL STATUS: ICD-10-CM

## 2024-07-03 DIAGNOSIS — Z12.31 VISIT FOR SCREENING MAMMOGRAM: ICD-10-CM

## 2024-07-03 DIAGNOSIS — N95.1 MENOPAUSAL SYMPTOMS: ICD-10-CM

## 2024-07-03 DIAGNOSIS — N39.46 MIXED STRESS AND URGE URINARY INCONTINENCE: ICD-10-CM

## 2024-07-03 DIAGNOSIS — Z01.419 WOMEN'S ANNUAL ROUTINE GYNECOLOGICAL EXAMINATION: Primary | ICD-10-CM

## 2024-07-03 PROCEDURE — 99396 PREV VISIT EST AGE 40-64: CPT | Mod: S$GLB,,, | Performed by: OBSTETRICS & GYNECOLOGY

## 2024-07-03 PROCEDURE — 1159F MED LIST DOCD IN RCRD: CPT | Mod: CPTII,S$GLB,, | Performed by: OBSTETRICS & GYNECOLOGY

## 2024-07-03 PROCEDURE — 3074F SYST BP LT 130 MM HG: CPT | Mod: CPTII,S$GLB,, | Performed by: OBSTETRICS & GYNECOLOGY

## 2024-07-03 PROCEDURE — 1160F RVW MEDS BY RX/DR IN RCRD: CPT | Mod: CPTII,S$GLB,, | Performed by: OBSTETRICS & GYNECOLOGY

## 2024-07-03 PROCEDURE — 99999 PR PBB SHADOW E&M-EST. PATIENT-LVL III: CPT | Mod: PBBFAC,,, | Performed by: OBSTETRICS & GYNECOLOGY

## 2024-07-03 PROCEDURE — 3078F DIAST BP <80 MM HG: CPT | Mod: CPTII,S$GLB,, | Performed by: OBSTETRICS & GYNECOLOGY

## 2024-07-03 PROCEDURE — 3008F BODY MASS INDEX DOCD: CPT | Mod: CPTII,S$GLB,, | Performed by: OBSTETRICS & GYNECOLOGY

## 2024-07-03 RX ORDER — PRASTERONE 6.5 MG/1
6.5 INSERT VAGINAL NIGHTLY
Qty: 30 EACH | Refills: 6 | Status: SHIPPED | OUTPATIENT
Start: 2024-07-03

## 2024-07-03 NOTE — PROGRESS NOTES
"Jayne Mejia is a 54 y.o. year old  female who presents for routine GYN exam.  She is postmenopausal, not taking hormones.  Previously, she had used IntraRosa to help with vaginal dryness / dyspareunia but has not recently used this medication.  She would like to return to the IntraRosa.  BMD last month revealed osteopenia.  She describes urinary incontinence associated with cough / sneeze as well as urgency symptoms.      Blood pressure 110/72, height 5' 2" (1.575 m), weight 63.5 kg (139 lb 15.9 oz).    24 Mammogram: Negative, TC: 12.9%    24 BMD:  Osteopenia (Spine T-1.3,  Hip T-.5, T-1.4)    4/10/23 Pap: Negative, HPV: Negative    Past Medical History:   Diagnosis Date    Bartholin cyst     History of abdominoplasty     Psoriasis     scalp    Shingles        Past Surgical History:   Procedure Laterality Date    COLONOSCOPY N/A 2020    Procedure: COLONOSCOPY;  Surgeon: Albin Cantu MD;  Location: [facility];  Service: Endoscopy;  Laterality: N/A;    Essure tubal occlusion      NOSE SURGERY      TUBAL LIGATION         OB History          1    Para   1    Term   1            AB        Living             SAB        IAB        Ectopic        Multiple        Live Births                       ROS:  GENERAL: Feeling well overall.   SKIN: Denies rash or lesions.   HEAD: Denies head injury or headache.   NODES: Denies enlarged lymph nodes.   CHEST: Denies chest pain or shortness of breath.   CARDIOVASCULAR: Denies palpitations or left sided chest pain.   ABDOMEN: No abdominal pain, nausea, vomiting or rectal bleeding.   URINARY:  Reports urinary incontinence.  REPRODUCTIVE: See HPI.   BREASTS: Denies pain, lumps, or nipple discharge.   HEMATOLOGIC: No easy bruisability or excessive bleeding.   MUSCULOSKELETAL: Denies joint pain.  NEUROLOGIC: Denies syncope or weakness.   PSYCHIATRIC: Denies depression.     PE:   (chaperone present during entire exam)  APPEARANCE: Well nourished, well " developed, in no acute distress.  BREASTS: Symmetrical, no skin changes or visible lesions. No palpable masses, nipple discharge or adenopathy bilaterally.  ABDOMEN: Soft. No tenderness or masses. No CVA tenderness.  VULVA: No lesions. Normal female genitalia.  URETHRAL MEATUS: Normal size and location, no lesions, no prolapse.  URETHRA: No masses, tenderness, prolapse or scarring.  VAGINA: Atrophic.  No lesions, no abnormal discharge, no significant cystocele or rectocele.  CERVIX: No lesions and discharge. PAP done.  UTERUS: Normal size, regular shape, mobile, non-tender, bladder base nontender.  ADNEXA: No masses, tenderness or CDS nodularity.  ANUS PERINEUM: Normal.    Diagnosis:  1. Women's annual routine gynecological examination    2. Postmenopausal status    3. Menopausal symptoms    4. Mixed stress and urge urinary incontinence    5. Visit for screening mammogram          PLAN:    Orders Placed This Encounter    Mammo Digital Screening Bilat w/ Jose    Ambulatory referral/consult to Physical/Occupational Therapy    prasterone, dhea, (INTRAROSA) 6.5 mg Inst       Patient was counseled today on postmenopausal issues.  We discussed vaginal dryness and dyspareunia for which she would like to return to IntraRosa.  We discussed osteopenia and the recommendations for adequate calcium, vitamin-D intake as well as weight-bearing exercise.  We also discussed her mixed incontinence- timed voiding, pelvic floor PT.    Follow-up in 1 year.    This note was created with voice recognition software.  Grammatical, syntax and spelling errors may be inevitable.

## 2024-09-11 ENCOUNTER — PATIENT MESSAGE (OUTPATIENT)
Dept: FAMILY MEDICINE | Facility: CLINIC | Age: 54
End: 2024-09-11
Payer: COMMERCIAL

## 2024-09-12 ENCOUNTER — TELEPHONE (OUTPATIENT)
Dept: FAMILY MEDICINE | Facility: CLINIC | Age: 54
End: 2024-09-12
Payer: COMMERCIAL

## 2024-09-12 NOTE — TELEPHONE ENCOUNTER
----- Message from Alpa Hdez sent at 9/12/2024 10:20 AM CDT -----  Contact: self  Type:  Sooner Appointment Request    Caller is requesting a sooner appointment.  Caller declined first available appointment listed below.  Caller will not accept being placed on the waitlist and is requesting a message be sent to doctor.    Name of Caller:  pt  When is the first available appointment?  N/a  Symptoms:  annual  Would the patient rather a call back or a response via [facility]? call  Best Call Back Number:  [phone]   Additional Information:  please call

## 2024-09-17 ENCOUNTER — PATIENT OUTREACH (OUTPATIENT)
Dept: ADMINISTRATIVE | Facility: HOSPITAL | Age: 54
End: 2024-09-17
Payer: COMMERCIAL

## 2024-09-17 NOTE — PROGRESS NOTES
Population Health Chart Review & Patient Outreach Details      Additional Pop Health Notes:               Updates Requested / Reviewed:      Updated Care Coordination Note and          Health Maintenance Topics Overdue:      VBHM Score: 0     Patient is not due for any topics at this time.                       Health Maintenance Topic(s) Outreach Outcomes & Actions Taken:    Lab(s) - Outreach Outcomes & Actions Taken  : External Records Uploaded & Care Team Updated if Applicable and hiv, lipid and A1c hyperlinked

## 2024-10-31 ENCOUNTER — OFFICE VISIT (OUTPATIENT)
Dept: FAMILY MEDICINE | Facility: CLINIC | Age: 54
End: 2024-10-31
Payer: COMMERCIAL

## 2024-10-31 VITALS
HEART RATE: 78 BPM | BODY MASS INDEX: 24.78 KG/M2 | RESPIRATION RATE: 18 BRPM | OXYGEN SATURATION: 98 % | HEIGHT: 62 IN | DIASTOLIC BLOOD PRESSURE: 84 MMHG | WEIGHT: 134.69 LBS | SYSTOLIC BLOOD PRESSURE: 100 MMHG

## 2024-10-31 DIAGNOSIS — L40.9 PSORIASIS: ICD-10-CM

## 2024-10-31 DIAGNOSIS — Z00.00 WELLNESS EXAMINATION: Primary | ICD-10-CM

## 2024-10-31 DIAGNOSIS — Z78.0 MENOPAUSE: ICD-10-CM

## 2024-10-31 DIAGNOSIS — Z23 IMMUNIZATION DUE: ICD-10-CM

## 2024-10-31 DIAGNOSIS — M85.89 OSTEOPENIA OF MULTIPLE SITES: ICD-10-CM

## 2024-10-31 PROCEDURE — 99999 PR PBB SHADOW E&M-EST. PATIENT-LVL III: CPT | Mod: PBBFAC,,, | Performed by: INTERNAL MEDICINE

## 2025-07-09 ENCOUNTER — PATIENT MESSAGE (OUTPATIENT)
Dept: FAMILY MEDICINE | Facility: CLINIC | Age: 55
End: 2025-07-09
Payer: COMMERCIAL

## 2025-07-09 ENCOUNTER — PATIENT MESSAGE (OUTPATIENT)
Dept: ADMINISTRATIVE | Facility: HOSPITAL | Age: 55
End: 2025-07-09
Payer: COMMERCIAL

## 2025-07-10 ENCOUNTER — HOSPITAL ENCOUNTER (OUTPATIENT)
Dept: RADIOLOGY | Facility: HOSPITAL | Age: 55
Discharge: HOME | End: 2025-07-10
Attending: OBSTETRICS & GYNECOLOGY
Payer: COMMERCIAL

## 2025-07-10 ENCOUNTER — PATIENT MESSAGE (OUTPATIENT)
Dept: OBSTETRICS AND GYNECOLOGY | Facility: CLINIC | Age: 55
End: 2025-07-10
Payer: COMMERCIAL

## 2025-07-10 ENCOUNTER — PATIENT OUTREACH (OUTPATIENT)
Dept: ADMINISTRATIVE | Facility: HOSPITAL | Age: 55
End: 2025-07-10
Payer: COMMERCIAL

## 2025-07-10 DIAGNOSIS — Z12.12 ENCOUNTER FOR COLORECTAL CANCER SCREENING: Primary | ICD-10-CM

## 2025-07-10 DIAGNOSIS — Z12.11 ENCOUNTER FOR COLORECTAL CANCER SCREENING: Primary | ICD-10-CM

## 2025-07-10 DIAGNOSIS — Z12.31 VISIT FOR SCREENING MAMMOGRAM: ICD-10-CM

## 2025-07-10 PROCEDURE — 77067 SCR MAMMO BI INCL CAD: CPT | Mod: 26,,, | Performed by: RADIOLOGY

## 2025-07-10 PROCEDURE — 77063 BREAST TOMOSYNTHESIS BI: CPT | Mod: 26,,, | Performed by: RADIOLOGY

## 2025-07-10 PROCEDURE — 77063 BREAST TOMOSYNTHESIS BI: CPT | Mod: TC,PO

## 2025-07-15 ENCOUNTER — CLINICAL SUPPORT (OUTPATIENT)
Dept: FAMILY MEDICINE | Facility: CLINIC | Age: 55
End: 2025-07-15
Payer: COMMERCIAL

## 2025-07-15 DIAGNOSIS — Z23 IMMUNIZATION DUE: Primary | ICD-10-CM

## 2025-07-15 PROCEDURE — 99999 PR PBB SHADOW E&M-EST. PATIENT-LVL I: CPT | Mod: PBBFAC,,,

## 2025-07-15 PROCEDURE — 90471 IMMUNIZATION ADMIN: CPT | Mod: S$GLB,,, | Performed by: INTERNAL MEDICINE

## 2025-07-15 PROCEDURE — 90715 TDAP VACCINE 7 YRS/> IM: CPT | Mod: S$GLB,,, | Performed by: INTERNAL MEDICINE

## 2025-07-23 ENCOUNTER — TELEPHONE (OUTPATIENT)
Dept: GASTROENTEROLOGY | Facility: CLINIC | Age: 55
End: 2025-07-23
Payer: COMMERCIAL

## 2025-08-05 ENCOUNTER — TELEPHONE (OUTPATIENT)
Dept: GASTROENTEROLOGY | Facility: CLINIC | Age: 55
End: 2025-08-05
Payer: COMMERCIAL